# Patient Record
Sex: FEMALE | Race: WHITE | Employment: OTHER | URBAN - METROPOLITAN AREA
[De-identification: names, ages, dates, MRNs, and addresses within clinical notes are randomized per-mention and may not be internally consistent; named-entity substitution may affect disease eponyms.]

---

## 2017-08-29 ENCOUNTER — HOSPITAL ENCOUNTER (OUTPATIENT)
Dept: PHYSICAL THERAPY | Age: 77
Discharge: HOME | End: 2017-08-29
Payer: MEDICARE

## 2017-08-29 ENCOUNTER — HOSPITAL ENCOUNTER (OUTPATIENT)
Dept: SURGERY | Age: 77
Discharge: HOME | End: 2017-08-29
Payer: MEDICARE

## 2017-08-29 VITALS
WEIGHT: 174 LBS | SYSTOLIC BLOOD PRESSURE: 147 MMHG | HEART RATE: 76 BPM | HEIGHT: 61 IN | DIASTOLIC BLOOD PRESSURE: 85 MMHG | BODY MASS INDEX: 32.85 KG/M2 | RESPIRATION RATE: 16 BRPM | TEMPERATURE: 96.3 F | OXYGEN SATURATION: 96 %

## 2017-08-29 LAB
ANION GAP SERPL CALC-SCNC: 11 MMOL/L (ref 7–16)
APPEARANCE UR: CLEAR
APTT PPP: 26.4 SEC (ref 23.5–31.7)
ATRIAL RATE: 72 BPM
BACTERIA SPEC CULT: ABNORMAL
BACTERIA URNS QL MICRO: ABNORMAL /HPF
BASOPHILS # BLD: 0 K/UL (ref 0–0.2)
BASOPHILS NFR BLD: 1 % (ref 0–2)
BILIRUB UR QL: NEGATIVE
BUN SERPL-MCNC: 20 MG/DL (ref 8–23)
CALCIUM SERPL-MCNC: 9.7 MG/DL (ref 8.3–10.4)
CALCULATED P AXIS, ECG09: 32 DEGREES
CALCULATED R AXIS, ECG10: -55 DEGREES
CALCULATED T AXIS, ECG11: 106 DEGREES
CASTS URNS QL MICRO: ABNORMAL /LPF
CHLORIDE SERPL-SCNC: 101 MMOL/L (ref 98–107)
CO2 SERPL-SCNC: 28 MMOL/L (ref 21–32)
COLOR UR: YELLOW
CREAT SERPL-MCNC: 0.89 MG/DL (ref 0.6–1)
DIAGNOSIS, 93000: NORMAL
DIFFERENTIAL METHOD BLD: ABNORMAL
EOSINOPHIL # BLD: 0.3 K/UL (ref 0–0.8)
EOSINOPHIL NFR BLD: 4 % (ref 0.5–7.8)
EPI CELLS #/AREA URNS HPF: ABNORMAL /HPF
ERYTHROCYTE [DISTWIDTH] IN BLOOD BY AUTOMATED COUNT: 13.4 % (ref 11.9–14.6)
GLUCOSE SERPL-MCNC: 129 MG/DL (ref 65–100)
GLUCOSE UR STRIP.AUTO-MCNC: NEGATIVE MG/DL
HCT VFR BLD AUTO: 43.5 % (ref 35.8–46.3)
HGB BLD-MCNC: 14.5 G/DL (ref 11.7–15.4)
HGB UR QL STRIP: NEGATIVE
IMM GRANULOCYTES # BLD: 0 K/UL (ref 0–0.5)
IMM GRANULOCYTES NFR BLD: 0.3 % (ref 0–5)
INR PPP: 1 (ref 0.9–1.2)
KETONES UR QL STRIP.AUTO: NEGATIVE MG/DL
LEUKOCYTE ESTERASE UR QL STRIP.AUTO: ABNORMAL
LYMPHOCYTES # BLD: 2.7 K/UL (ref 0.5–4.6)
LYMPHOCYTES NFR BLD: 37 % (ref 13–44)
MCH RBC QN AUTO: 30.5 PG (ref 26.1–32.9)
MCHC RBC AUTO-ENTMCNC: 33.3 G/DL (ref 31.4–35)
MCV RBC AUTO: 91.6 FL (ref 79.6–97.8)
MONOCYTES # BLD: 0.4 K/UL (ref 0.1–1.3)
MONOCYTES NFR BLD: 5 % (ref 4–12)
NEUTS SEG # BLD: 3.8 K/UL (ref 1.7–8.2)
NEUTS SEG NFR BLD: 53 % (ref 43–78)
NITRITE UR QL STRIP.AUTO: NEGATIVE
P-R INTERVAL, ECG05: 144 MS
PH UR STRIP: 6.5 [PH] (ref 5–9)
PLATELET # BLD AUTO: 303 K/UL (ref 150–450)
PMV BLD AUTO: 10.3 FL (ref 10.8–14.1)
POTASSIUM SERPL-SCNC: 3.6 MMOL/L (ref 3.5–5.1)
PROT UR STRIP-MCNC: NEGATIVE MG/DL
PROTHROMBIN TIME: 10.6 SEC (ref 9.6–12)
Q-T INTERVAL, ECG07: 416 MS
QRS DURATION, ECG06: 160 MS
QTC CALCULATION (BEZET), ECG08: 455 MS
RBC # BLD AUTO: 4.75 M/UL (ref 4.05–5.25)
RBC #/AREA URNS HPF: ABNORMAL /HPF
SERVICE CMNT-IMP: ABNORMAL
SODIUM SERPL-SCNC: 140 MMOL/L (ref 136–145)
SP GR UR REFRACTOMETRY: 1.01 (ref 1–1.02)
UROBILINOGEN UR QL STRIP.AUTO: 0.2 EU/DL (ref 0.2–1)
VENTRICULAR RATE, ECG03: 72 BPM
WBC # BLD AUTO: 7.2 K/UL (ref 4.3–11.1)
WBC URNS QL MICRO: ABNORMAL /HPF

## 2017-08-29 PROCEDURE — G8979 MOBILITY GOAL STATUS: HCPCS

## 2017-08-29 PROCEDURE — 87641 MR-STAPH DNA AMP PROBE: CPT | Performed by: PHYSICIAN ASSISTANT

## 2017-08-29 PROCEDURE — G8980 MOBILITY D/C STATUS: HCPCS

## 2017-08-29 PROCEDURE — 36415 COLL VENOUS BLD VENIPUNCTURE: CPT | Performed by: PHYSICIAN ASSISTANT

## 2017-08-29 PROCEDURE — 85730 THROMBOPLASTIN TIME PARTIAL: CPT | Performed by: PHYSICIAN ASSISTANT

## 2017-08-29 PROCEDURE — 80048 BASIC METABOLIC PNL TOTAL CA: CPT | Performed by: PHYSICIAN ASSISTANT

## 2017-08-29 PROCEDURE — G8978 MOBILITY CURRENT STATUS: HCPCS

## 2017-08-29 PROCEDURE — 77030027138 HC INCENT SPIROMETER -A

## 2017-08-29 PROCEDURE — 85610 PROTHROMBIN TIME: CPT | Performed by: PHYSICIAN ASSISTANT

## 2017-08-29 PROCEDURE — 93005 ELECTROCARDIOGRAM TRACING: CPT | Performed by: ANESTHESIOLOGY

## 2017-08-29 PROCEDURE — 81003 URINALYSIS AUTO W/O SCOPE: CPT | Performed by: PHYSICIAN ASSISTANT

## 2017-08-29 PROCEDURE — 97161 PT EVAL LOW COMPLEX 20 MIN: CPT

## 2017-08-29 PROCEDURE — 85025 COMPLETE CBC W/AUTO DIFF WBC: CPT | Performed by: PHYSICIAN ASSISTANT

## 2017-08-29 RX ORDER — MULTIVIT WITH MINERALS/HERBS
1 TABLET ORAL 2 TIMES WEEKLY
COMMUNITY

## 2017-08-29 NOTE — PERIOP NOTES
Patient verified name, , and surgery as listed in [facility]. Type 3 surgery, PAT joint assessment complete. Labs per surgeon: cbc, bmp, UA, PT, PTT, MRSA nasal swab; results within anesthesia guidelines. MRSA nasal swab remains pending. Labs per anesthesia protocol: no additional labs  EKG:done today- within anesthesia guidelines. Pt. has a pacemaker. All records including last office visit and last interrogation requested from   cardiology via fax request ([phone]). No need to contact [facility] for pacemaker because it is a below the waist surgery. Hibiclens and instructions to return bottle on DOS given per hospital policy. Nasal Swab collected per MD order and instructions for Mupirocin nasal ointment if required. Patient provided with handouts including Guide to Surgery, Pain Management, Hand Hygiene, Blood Transfusion Education, and [facility] Brochure. Patient answered medical/surgical history questions at their best of ability. All prior to admission medications documented in [facility]. Original medication prescription bottle not visualized during patient appointment. Patient instructed to hold all vitamins 7 days prior to surgery and NSAIDS 5 days prior to surgery. Medications to be held: mobic and vitamins. Patient instructed to continue previous medications as prescribed prior to surgery and to take the following medications the day of surgery according to anesthesia guidelines with a small sip of water: aspirin and coreg. Patient teach back successful and patient demonstrates knowledge of instruction.

## 2017-08-29 NOTE — PROGRESS NOTES
Meghan Ohm  : [birth year](10 y.o.) [facility] [place] [address].  [phone]       Physical Therapy Prehab Plan of Treatment and Evaluation Summary:2017    ICD-10: Treatment Diagnosis:   · Pain in Right Knee (M25.561)  · Stiffness of Right Knee, Not elsewhere classified (M25.661)  Precautions/Allergies:   Ace inhibitors and Demerol [meperidine]  MEDICAL/REFERRING DIAGNOSIS:  Unilateral primary osteoarthritis, right knee [M17.11]  REFERRING PHYSICIAN: Burgess Malloy,*  DATE OF SURGERY:   Assessment:   Comments:  She had a L TKA in . She plans on going home at [place] with her spouses assist     PROBLEM LIST (Impacting functional limitations):  Ms. Autumn Orellana presents with the following right lower extremity(s) problems:  1. Strength  2. Range of Motion  3. Home Exercise Program  4. Pain   INTERVENTIONS PLANNED:  1. Home Exercise Program  2. Educational Discussion     TREATMENT PLAN: Effective Dates: 2017 TO 2017. Frequency/Duration: Patient to continue to perform home exercise program at least twice per day up until her surgery. GOALS: (Goals have been discussed and agreed upon with patient.)  Discharge Goals: Time Frame: 1 Day  1. Patient will demonstrate independence with a home exercise program designed to increase strength, range of motion and pain control to minimize functional deficits and optimize patient for total joint replacement. Rehabilitation Potential For Stated Goals: Good  Regarding Steve Reina's therapy, I certify that the treatment plan above will be carried out by a therapist or under their direction.   Thank you for this referral,  Tomas Ferreira, PT               HISTORY:   Present Symptoms:  Pain Intensity 1: 5 (at its worst)  Pain Location 1: Knee  Pain Orientation 1: Anterior, Medial, Right   History of Present Injury/Illness (Reason for Referral):  Medical/Referring Diagnosis: Unilateral primary osteoarthritis, right knee [M17.11]   Past Medical History/Comorbidities:   Ms. Bhavna Dacosta  has a past medical history of Acquired keratoderma; Aortic valve sclerosis; Conduction disorder, unspecified; Contusion of chest wall; Herpes zoster without mention of complication; History of third degree heart block; Hypercholesterolemia; Hypertension; Impaired fasting glucose (2014); Loss of weight; Mixed hyperlipidemia (2014); Osteoarthritis; Osteoporosis, post-menopausal; Other malaise and fatigue; Other primary cardiomyopathies (2014); PAF (paroxysmal atrial fibrillation) ([address] (2013); Paroxysmal a-fib ([address] Pes anserinus tendinitis or bursitis; PUD (peptic ulcer disease); Sick sinus syndrome ([address] Sick sinus syndrome ([address] (2013); Third degree heart block ([address] (2013); Unspecified essential hypertension (2014); and Unspecified vitamin D deficiency. She also has no past medical history of Aneurysm ([address] Coagulation defects; COPD; Difficult intubation; Heart failure ([address] Malignant hyperthermia due to anesthesia; Morbid obesity ([address] Nausea & vomiting; Other ill-defined conditions; Pseudocholinesterase deficiency; Psychiatric disorder; Unspecified adverse effect of anesthesia; or Unspecified sleep apnea. Ms. Bhavna Dacosta  has a past surgical history that includes pacemaker placement (); tonsillectomy (); appendectomy ();  section; dilation and curettage (multiple ); total abdominal hysterectomy (); breast lumpectomy (); open cholecystectomy (); hammer toe repair (); cholecystectomy; pacemaker; and orthopaedic (Left).   Social History/Living Environment:   Home Environment: Private residence  # Steps to Enter: 5  Hand Rails : Right  One/Two Story Residence: Two story, live on 1st floor  # of Interior Steps: 10  Interior Rails: Left  Living Alone: No  Support Systems: Spouse/Significant Other/Partner  Patient Expects to be Discharged to[employer] residence  Current DME Used/Available at Home: Juanita Brennan, rolling, Shower chair, Commode, bedside  Tub or Shower Type: Shower  Work/Activity:  retired  Dominant Side:  RIGHT  Current Medications:  See [address] note   Number of Personal Factors/Comorbidities that affect the Plan of Care: 1-2: MODERATE COMPLEXITY   EXAMINATION:   ADLs (Current Functional Status):   Ambulation:  [x] Independent  [] Walk Indoors Only  [x] Walk Outdoors  [] Use Assistive Device  [] Use Wheelchair Only Dressing:  [x] [address] from Someone for:  [] Sock/Shoes  [] Pants  [] Everything   Bathing/Showering:   [x] Independent  [] Requires Assistance from Someone  [] [address] Only Household Activities:  [x] Routine house and yard work  [] Light Housework Only  [] None   Observation/Orthostatic Postural Assessment:    Genu varus right  ROM/Flexibility:   AROM: Within functional limits (L LE)                       RLE AROM  R Knee Flexion: 105  R Knee Extension: 15   Strength:   Strength: Generally decreased, functional (L LE 4/5)              RLE Strength  R Hip Flexion: 4  R Knee Extension: 3+  R Ankle Dorsiflexion: 4   Functional Mobility:    Sensation: Intact (LL E)    Stand to Sit: Independent  Sit to Stand: Independent  Stand Pivot Transfers: Independent  Distance (ft): 1000 Feet (ft)  Ambulation - Level of Assistance: Independent  Speed/Helen: Pace decreased (<100 feet/min)  Gait Abnormalities: Antalgic          Balance:    Sitting: Intact  Standing: Intact   Body Structures Involved:  1. Bones  2. Joints  3. Muscles Body Functions Affected:  1. Neuromusculoskeletal  2. Movement Related Activities and Participation Affected:  1. Mobility   Number of elements that affect the Plan of Care: 4+: HIGH COMPLEXITY   CLINICAL PRESENTATION:   Presentation: Stable and uncomplicated: LOW COMPLEXITY   CLINICAL DECISION MAKING:   Outcome Measure:    Tool Used: Lower Extremity Functional Scale (LEFS)  Score: Initial: 41/80 Most Recent: X/80 (Date: -- )   Interpretation of Score: 20 questions each scored on a 5 point scale with 0 representing \"extreme difficulty or unable to perform\" and 4 representing \"no difficulty\". The lower the score, the greater the functional disability. 80/80 represents no disability. Minimal detectable change is 9 points. Score 80 79-65 64-49 48-33 32-17 16-1 0   Modifier CH CI CJ CK CL CM CN     ? Mobility - Walking and Moving Around:     - CURRENT STATUS: CK - 40%-59% impaired, limited or restricted    - GOAL STATUS: CK - 40%-59% impaired, limited or restricted    - D/C STATUS:  CK - 40%-59% impaired, limited or restricted    Medical Necessity:   · Ms. Reina is expected to optimize her lower extremity strength and ROM in preparation for joint replacement surgery. Reason for Services/Other Comments:  · Achieve baseline assesment of musculoskeletal system, functional mobility and home environment. , educate in PT HEP in preparation for surgery, educate in hospital plan of care. Use of outcome tool(s) and clinical judgement create a POC that gives a: Clear prediction of patient's progress: LOW COMPLEXITY   TREATMENT:   Treatment/Session Assessment:  Patient was instructed in PT- HEP to increase strength and ROM in LEs. Answered all questions. · Post session pain:  1  · Compliance with Program/Exercises: compliant all of the time.   Total Treatment Duration:PT Patient Time In/Time Out  Time In: 1030  Time Out: [address] [place], [place]

## 2017-08-29 NOTE — ADVANCED PRACTICE NURSE
Total Joint Surgery Preoperative Chart Review      Patient ID:  Kiah Gentile  [id number]  89 y.o.  1940  Surgeon: Dr. Richard Caballero  Date of Surgery: 2017  Procedure: Total Right Knee Arthroplasty  Primary Care Physician: Ramiro Pagan MD None  Specialty Physician(s):      Subjective:   Kiah Gentile is a 68 y.o. WHITE [place]  female who presents for preoperative evaluation for Total Right Knee arthroplasty. This is a preoperative chart review note based on data collected by the nurse at the surgical Pre-Assessment visit. Past Medical History:   Diagnosis Date    Acquired keratoderma     Pt. denies     Aortic valve sclerosis     NO stenosis as of 8/3/2012- pt. denies     Conduction disorder, unspecified     Contusion of chest wall     Herpes zoster without mention of complication     History of third degree heart block     pacemaker    Hypercholesterolemia     controlled with meds     Hypertension     Controlled by meds     Impaired fasting glucose 2014    Loss of weight     Mixed hyperlipidemia 2014    Murmur, cardiac     As a child- pt. states resolved now.      Osteoarthritis     Osteoporosis, post-menopausal     Other malaise and fatigue     Other primary cardiomyopathies 2014    PAF (paroxysmal atrial fibrillation) (HCC) 2013    Paroxysmal a-fib (HCC)     Pes anserinus tendinitis or bursitis     pt denies     PUD (peptic ulcer disease)     was + for h pylori, tx'd     Sick sinus syndrome ([address]     Sick sinus syndrome ([address] 2013    Third degree heart block ([address] 2013    Unspecified essential hypertension 2014    Unspecified vitamin D deficiency       Past Surgical History:   Procedure Laterality Date    HX APPENDECTOMY      HX BREAST LUMPECTOMY  1990    left, benign    HX  SECTION      x 3    HX CHOLECYSTECTOMY      HX DILATION AND CURETTAGE  multiple 1960s    HX HAMMER TOE REPAIR      HX OPEN CHOLECYSTECTOMY 1992    HX ORTHOPAEDIC Left     left knee replacement    HX PACEMAKER      HX PACEMAKER PLACEMENT  2006, 2013    dual wire, upper and lower chamber pacemaker dependent    HX TONSILLECTOMY  1958    HX TOTAL ABDOMINAL HYSTERECTOMY  1969     Family History   Problem Relation Age of Onset    Heart Disease Mother     Diabetes Mother     Hypertension Mother     Heart Disease Father     Heart Attack Father     Hypertension Father     Malignant Hyperthermia Neg Hx     Pseudocholinesterase Deficiency Neg Hx     Delayed Awakening Neg Hx     Post-op Nausea/Vomiting Neg Hx     Emergence Delirium Neg Hx     Post-op Cognitive Dysfunction Neg Hx     Other Neg Hx       Social History   Substance Use Topics    Smoking status: Never Smoker    Smokeless tobacco: Never Used    Alcohol use No       Prior to Admission medications    Medication Sig Start Date End Date Taking? Authorizing Provider   b complex vitamins (B-100 COMPLEX) tablet Take 1 Tab by mouth two (2) times a week. Yes Historical Provider   meloxicam (MOBIC) 15 mg tablet TAKE 1 TABLET EVERY DAY  Patient taking differently: TAKE 1 TABLET EVERY DAY; Pt. takes 1/2 tablet daily and a one tablet with flare ups. 1/22/16  Yes Ramiro Pagan MD   carvedilol (COREG) 12.5 mg tablet TAKE 1 TABLET TWICE DAILY 1/22/16  Yes Ramiro Pagan MD   losartan-hydrochlorothiazide [facility]) 50-12.5 mg per tablet Take 1 Tab by mouth daily. 11/17/15  Yes Ramiro Pagan MD   lovastatin (MEVACOR) 40 mg tablet Take 1 Tab by mouth nightly. 10/12/15  Yes Ramiro Pagan MD   omega-3 fatty acids-vitamin e (FISH OIL) 1,000 mg cap Take 2 Caps by mouth daily. Yes Historical Provider   calcium 500 mg tab Take 1 Tab by mouth two (2) times a day. Yes Historical Provider   L-Methylfolate-B30-Oyskbcswlk (CEREFOLIN NAC) tablet Take 1 Tab by mouth daily. Yes Historical Provider   aspirin delayed-release 81 mg tablet Take 81 mg by mouth nightly.    Yes Historical Provider   cyanocobalamin (VITAMIN B-12) 500 mcg tablet Take 500 mcg by mouth daily. Indications: \"increase energy\"   Yes Historical Provider   Cholecalciferol, Vitamin D3, (VITAMIN D3) 1,000 unit cap Take 2 Tabs by mouth daily. Takes 2 per day in the Fall/winter only   Indications: PREVENTION OF VITAMIN D DEFICIENCY   Yes Historical Provider     Allergies   Allergen Reactions    Ace Inhibitors Cough    Demerol [Meperidine] Nausea and Vomiting          Objective:     Physical Exam:   Patient Vitals for the past 24 hrs:   Temp Pulse Resp BP SpO2   08/29/17 1204 96.3 °F (35.7 °C) 76 16 147/85 96 %       ECG:    EKG Results     Procedure 720 Value Units Date/Time    EKG, 12 LEAD, INITIAL [[id number]] Collected:  08/29/17 1247    Order Status:  Completed Updated:  08/29/17 1259     Ventricular Rate 72 BPM      Atrial Rate 72 BPM      P-R Interval 144 ms      QRS Duration 160 ms      Q-T Interval 416 ms      QTC Calculation (Bezet) 455 ms      Calculated P Axis 32 degrees      Calculated R Axis -55 degrees      Calculated T Axis 106 degrees      Diagnosis --     AV sequential or dual chamber electronic pacemaker  When compared with ECG of 27-AUG-2013 12:42,  Vent. rate has decreased BY  12 BPM            Data Review:   Labs:   Recent Results (from the past 24 hour(s))   CBC WITH AUTOMATED DIFF    Collection Time: 08/29/17 10:43 AM   Result Value Ref Range    WBC 7.2 4.3 - 11.1 K/uL    RBC 4.75 4.05 - 5.25 M/uL    HGB 14.5 11.7 - 15.4 g/dL    HCT 43.5 35.8 - 46.3 %    MCV 91.6 79.6 - 97.8 FL    MCH 30.5 26.1 - 32.9 PG    MCHC 33.3 31.4 - 35.0 g/dL    RDW 13.4 11.9 - 14.6 %    PLATELET 460 749 - 907 K/uL    MPV 10.3 (L) 10.8 - 14.1 FL    DF AUTOMATED      NEUTROPHILS 53 43 - 78 %    LYMPHOCYTES 37 13 - 44 %    MONOCYTES 5 4.0 - 12.0 %    EOSINOPHILS 4 0.5 - 7.8 %    BASOPHILS 1 0.0 - 2.0 %    IMMATURE GRANULOCYTES 0.3 0.0 - 5.0 %    ABS. NEUTROPHILS 3.8 1.7 - 8.2 K/UL    ABS. LYMPHOCYTES 2.7 0.5 - 4.6 K/UL    ABS.  MONOCYTES 0.4 0.1 - 1.3 K/UL ABS. EOSINOPHILS 0.3 0.0 - 0.8 K/UL    ABS. BASOPHILS 0.0 0.0 - 0.2 K/UL    ABS. IMM. GRANS. 0.0 0.0 - 0.5 K/UL   PROTHROMBIN TIME + INR    Collection Time: 08/29/17 10:43 AM   Result Value Ref Range    Prothrombin time 10.6 9.6 - 12.0 sec    INR 1.0 0.9 - 1.2     PTT    Collection Time: 08/29/17 10:43 AM   Result Value Ref Range    aPTT 26.4 23.5 - 20.2 SEC   METABOLIC PANEL, BASIC    Collection Time: 08/29/17 10:43 AM   Result Value Ref Range    Sodium 140 136 - 145 mmol/L    Potassium 3.6 3.5 - 5.1 mmol/L    Chloride 101 98 - 107 mmol/L    CO2 28 21 - 32 mmol/L    Anion gap 11 7 - 16 mmol/L    Glucose 129 (H) 65 - 100 mg/dL    BUN 20 8 - 23 MG/DL    Creatinine 0.89 0.6 - 1.0 MG/DL    GFR est AA >60 >60 ml/min/1.73m2    GFR est non-AA >60 >60 ml/min/1.73m2    Calcium 9.7 8.3 - 10.4 MG/DL   URINALYSIS W/ RFLX MICROSCOPIC    Collection Time: 08/29/17 10:43 AM   Result Value Ref Range    Color YELLOW      Appearance CLEAR      Specific gravity 1.012 1.001 - 1.023      pH (UA) 6.5 5.0 - 9.0      Protein NEGATIVE  NEG mg/dL    Glucose NEGATIVE  NEG mg/dL    Ketone NEGATIVE  NEG mg/dL    Bilirubin NEGATIVE  NEG      Blood NEGATIVE  NEG      Urobilinogen 0.2 0.2 - 1.0 EU/dL    Nitrites NEGATIVE  NEG      Leukocyte Esterase TRACE (A) NEG      WBC 5-10 0 /hpf    RBC 0-3 0 /hpf    Epithelial cells 0-3 0 /hpf    Bacteria 1+ (H) 0 /hpf    Casts 0-3 0 /lpf   EKG, 12 LEAD, INITIAL    Collection Time: 08/29/17 12:47 PM   Result Value Ref Range    Ventricular Rate 72 BPM    Atrial Rate 72 BPM    P-R Interval 144 ms    QRS Duration 160 ms    Q-T Interval 416 ms    QTC Calculation (Bezet) 455 ms    Calculated P Axis 32 degrees    Calculated R Axis -55 degrees    Calculated T Axis 106 degrees    Diagnosis       AV sequential or dual chamber electronic pacemaker  When compared with ECG of 27-AUG-2013 12:42,  Vent.  rate has decreased BY  12 BPM           Problem List:  )  Patient Active Problem List   Diagnosis Code    Osteoarthritis of left knee M17.12    Total knee replacement status Z96.659    PAF (paroxysmal atrial fibrillation) (HCC) I48.0    Sick sinus syndrome (HCC) I49.5    Third degree heart block (HCC) I44.2    Essential hypertension I10    Mixed hyperlipidemia E78.2    Disorder of bone and cartilage, unspecified M89.9, M94.9    Impaired fasting glucose R73.01    Other primary cardiomyopathies I42.8    Osteoarthritis M19.90       Total Joint Surgery Pre-Assessment Recommendations:         None        Signed By: Koki Lloyd NP-C    August 29, 2017

## 2017-08-29 NOTE — PROGRESS NOTES
08/29/17 1030   Oxygen Therapy   O2 Sat (%) 97 %   Pulse via Oximetry 77 beats per minute   O2 Device Room air   Pre-Treatment   Breath Sounds Bilateral Clear   Pre FEV1 (liters) 1.9 liters   % Predicted 106   Incentive Spirometry Treatment   Actual Volume (ml) 1500 ml     Initial respiratory Assessment completed with pt. Pt was interviewed and evaluated in [facility] prior to surgery. Patient ID:  Nj Bingham  [id number]  72 y.o.  1940  Surgeon: Dr. Marylene Campion  Date of Surgery: 9/21/2017  Procedure: Total Right Knee Arthroplasty  Primary Care Physician: Izabella Wright MD None  Specialists:                                  Pt instructed in the use of Incentive Spirometry. Pt instructed to bring Incentive Spirometer back on date of surgery & to start using Is upon return to pt room. Pt taught proper cough technique      History of smoking:   NONE      Quit date:    Secondhand smoke:FATHER      Past procedures with Oxygen desaturation:NONE    Past Medical History:   Diagnosis Date    Acquired keratoderma     Pt. denies     Aortic valve sclerosis     NO stenosis as of 8/3/2012- pt. denies     Conduction disorder, unspecified     Contusion of chest wall     Herpes zoster without mention of complication     History of third degree heart block     pacemaker    Hypercholesterolemia     controlled with meds     Hypertension     Controlled by meds     Impaired fasting glucose 9/4/2014    Loss of weight     Mixed hyperlipidemia 9/4/2014    Murmur, cardiac     As a child- pt. states resolved now.      Osteoarthritis     Osteoporosis, post-menopausal     Other malaise and fatigue     Other primary cardiomyopathies 9/4/2014    PAF (paroxysmal atrial fibrillation) (HCC) 9/20/2013    Paroxysmal a-fib (HCC)     Pes anserinus tendinitis or bursitis     pt denies     PUD (peptic ulcer disease)     was + for h pylori, tx'd     Sick sinus syndrome ([address]     Sick sinus syndrome ([address] 9/20/2013    Third degree heart block [facility] 2013    Unspecified essential hypertension 2014    Unspecified vitamin D deficiency                                                                                                                                                        Respiratory history:                                 SOB  ON EXERTION                                    Respiratory meds:                                                         FAMILY PRESENT:             YES                                               PAST SLEEP STUDY:               NO  HX OF CHIKI:                             NO                                     CHIKI assessment:                                               SLEEPS ON SIDE      PHYSICAL EXAM   Body mass index is 32.88 kg/(m^2).    Visit Vitals    /85 (BP 1 Location: Left arm, BP Patient Position: At rest;Sitting)    Pulse 76    Temp 96.3 °F (35.7 °C)    Resp 16    Ht 5' 1\" (1.549 m)    Wt 78.9 kg (174 lb)    SpO2 96%    BMI 32.88 kg/m2     Neck circumference:35      cm    Loud snoring:   DENIES    Witnessed apnea or wakening gasping or choking:,DENIES,      Awakens with headaches:DENIES    Morning or daytime tiredness/ sleepiness: DENIES     Dry mouth or sore throat in morning:  DENIES    Freidman stage:4    SACS score:4    STOP/BAN                              CPAP:NA               NONE  Referrals:    Pt. Phone Number:

## 2017-08-29 NOTE — PERIOP NOTES
Labs sent to PCP per surgeon's request.    Recent Results (from the past 12 hour(s))   CBC WITH AUTOMATED DIFF    Collection Time: 08/29/17 10:43 AM   Result Value Ref Range    WBC 7.2 4.3 - 11.1 K/uL    RBC 4.75 4.05 - 5.25 M/uL    HGB 14.5 11.7 - 15.4 g/dL    HCT 43.5 35.8 - 46.3 %    MCV 91.6 79.6 - 97.8 FL    MCH 30.5 26.1 - 32.9 PG    MCHC 33.3 31.4 - 35.0 g/dL    RDW 13.4 11.9 - 14.6 %    PLATELET 437 878 - 539 K/uL    MPV 10.3 (L) 10.8 - 14.1 FL    DF AUTOMATED      NEUTROPHILS 53 43 - 78 %    LYMPHOCYTES 37 13 - 44 %    MONOCYTES 5 4.0 - 12.0 %    EOSINOPHILS 4 0.5 - 7.8 %    BASOPHILS 1 0.0 - 2.0 %    IMMATURE GRANULOCYTES 0.3 0.0 - 5.0 %    ABS. NEUTROPHILS 3.8 1.7 - 8.2 K/UL    ABS. LYMPHOCYTES 2.7 0.5 - 4.6 K/UL    ABS. MONOCYTES 0.4 0.1 - 1.3 K/UL    ABS. EOSINOPHILS 0.3 0.0 - 0.8 K/UL    ABS. BASOPHILS 0.0 0.0 - 0.2 K/UL    ABS. IMM.  GRANS. 0.0 0.0 - 0.5 K/UL   PROTHROMBIN TIME + INR    Collection Time: 08/29/17 10:43 AM   Result Value Ref Range    Prothrombin time 10.6 9.6 - 12.0 sec    INR 1.0 0.9 - 1.2     PTT    Collection Time: 08/29/17 10:43 AM   Result Value Ref Range    aPTT 26.4 23.5 - 85.0 SEC   METABOLIC PANEL, BASIC    Collection Time: 08/29/17 10:43 AM   Result Value Ref Range    Sodium 140 136 - 145 mmol/L    Potassium 3.6 3.5 - 5.1 mmol/L    Chloride 101 98 - 107 mmol/L    CO2 28 21 - 32 mmol/L    Anion gap 11 7 - 16 mmol/L    Glucose 129 (H) 65 - 100 mg/dL    BUN 20 8 - 23 MG/DL    Creatinine 0.89 0.6 - 1.0 MG/DL    GFR est AA >60 >60 ml/min/1.73m2    GFR est non-AA >60 >60 ml/min/1.73m2    Calcium 9.7 8.3 - 10.4 MG/DL   URINALYSIS W/ RFLX MICROSCOPIC    Collection Time: 08/29/17 10:43 AM   Result Value Ref Range    Color YELLOW      Appearance CLEAR      Specific gravity 1.012 1.001 - 1.023      pH (UA) 6.5 5.0 - 9.0      Protein NEGATIVE  NEG mg/dL    Glucose NEGATIVE  NEG mg/dL    Ketone NEGATIVE  NEG mg/dL    Bilirubin NEGATIVE  NEG      Blood NEGATIVE  NEG      Urobilinogen 0.2 0.2 - 1.0 EU/dL    Nitrites NEGATIVE  NEG      Leukocyte Esterase TRACE (A) NEG      WBC 5-10 0 /hpf    RBC 0-3 0 /hpf    Epithelial cells 0-3 0 /hpf    Bacteria 1+ (H) 0 /hpf    Casts 0-3 0 /lpf   EKG, 12 LEAD, INITIAL    Collection Time: 08/29/17 12:47 PM   Result Value Ref Range    Ventricular Rate 72 BPM    Atrial Rate 72 BPM    P-R Interval 144 ms    QRS Duration 160 ms    Q-T Interval 416 ms    QTC Calculation (Bezet) 455 ms    Calculated P Axis 32 degrees    Calculated R Axis -55 degrees    Calculated T Axis 106 degrees    Diagnosis       AV sequential or dual chamber electronic pacemaker  When compared with ECG of 27-AUG-2013 12:42,  Vent.  rate has decreased BY  12 BPM  Confirmed by [facility] [place] [facility]  MD (), Jack Chan ([id number]) on 8/29/2017 1:41:07 PM

## 2017-08-30 RX ORDER — MUPIROCIN 20 MG/G
OINTMENT TOPICAL 2 TIMES DAILY
COMMUNITY
Start: 2017-09-16 | End: 2017-09-20

## 2017-08-30 NOTE — PERIOP NOTES
Nasal swab results reviewed:   Culture result:  (A)    Final   MRSA target DNA not detected, SA target DNA detected.   A MRSA negative, SA positive test result does not preclude MRSA nasal colonization. Called pt, left message to return call to PAT re: lab results    Called Mupirocin Rx to [address]  Pt to apply to ea nostril intranasally twice a day for 5 days beginning :9/16/17

## 2017-09-15 NOTE — H&P
H&P    Patient ID:  Carito Freeman  [id number]  3 y.o.  1940  Surgeon:  Zeke Pallas, MD  Date of Surgery: * No surgery date entered *  Procedure: Right Knee Total Arthroplasty  Primary Care Physician: Christiano Alexander MD        Subjective:  Carito Freeman is a 68 y.o. WHITE [place]  female who presents with Right Knee pain. She has history of Right Knee pain for several years ago. Symptoms worse with walking, squatting, climbing stairs, weight bearing, initiation of activity and bathing and relieved with rest, NSAIDs: How long months, activity modification and steroid injections. Conservative treatment consisting of  activity modification, NSAIDs, analgesics and therapeutic injections into the knee has not helped. The patient  lives with their family. The patients goal after surgery is improved pain and function. Past Medical History:   Diagnosis Date    Acquired keratoderma     Pt. denies     Aortic valve sclerosis     per echo dated 3/11/16- no aortic stenosis noted      Conduction disorder, unspecified     Contusion of chest wall     Herpes zoster without mention of complication     History of third degree heart block     pacemaker    Hypercholesterolemia     controlled with meds     Hypertension     Controlled by meds     Impaired fasting glucose 9/4/2014    Loss of weight     Mixed hyperlipidemia 9/4/2014    Murmur, cardiac     As a child- pt. states resolved now.      Osteoarthritis     Osteoporosis, post-menopausal     Other malaise and fatigue     Other primary cardiomyopathies 9/4/2014    PAF (paroxysmal atrial fibrillation) (HCC) 9/20/2013    Paroxysmal a-fib (HCC)     Pes anserinus tendinitis or bursitis     pt denies     PUD (peptic ulcer disease)     was + for h pylori, tx'd     Sick sinus syndrome ([address]     Sick sinus syndrome ([address] 9/20/2013    Third degree heart block ([address] 9/20/2013    Unspecified essential hypertension 9/4/2014    Unspecified vitamin D deficiency       Past Surgical History:   Procedure Laterality Date    HX APPENDECTOMY      HX BREAST LUMPECTOMY  1990    left, benign    HX  SECTION      x 3    HX CHOLECYSTECTOMY  1992    HX DILATION AND CURETTAGE  multiple 1960s    HX HAMMER TOE REPAIR  1998    HX OPEN CHOLECYSTECTOMY  1992    HX ORTHOPAEDIC Left     left knee replacement    HX PACEMAKER      HX PACEMAKER PLACEMENT  ,     dual wire, upper and lower chamber pacemaker dependent    HX TONSILLECTOMY      HX TOTAL ABDOMINAL HYSTERECTOMY  1969     Family History   Problem Relation Age of Onset    Heart Disease Mother     Diabetes Mother     Hypertension Mother     Heart Disease Father     Heart Attack Father     Hypertension Father     Malignant Hyperthermia Neg Hx     Pseudocholinesterase Deficiency Neg Hx     Delayed Awakening Neg Hx     Post-op Nausea/Vomiting Neg Hx     Emergence Delirium Neg Hx     Post-op Cognitive Dysfunction Neg Hx     Other Neg Hx       Social History   Substance Use Topics    Smoking status: Never Smoker    Smokeless tobacco: Never Used    Alcohol use No       Prior to Admission medications    Medication Sig Start Date End Date Taking? Authorizing Provider   mupirocin (BACTROBAN) 2 % ointment by Both Nostrils route two (2) times a day. 17  Historical Provider   b complex vitamins (B-100 COMPLEX) tablet Take 1 Tab by mouth two (2) times a week. Historical Provider   meloxicam (MOBIC) 15 mg tablet TAKE 1 TABLET EVERY DAY  Patient taking differently: TAKE 1 TABLET EVERY DAY; Pt. takes 1/2 tablet daily and a one tablet with flare ups. 16   Mary Palomo MD   carvedilol (COREG) 12.5 mg tablet TAKE 1 TABLET TWICE DAILY 16   Mary Palomo MD   losartan-hydrochlorothiazide [facility]) 50-12.5 mg per tablet Take 1 Tab by mouth daily. 11/17/15   Mary Palomo MD   lovastatin (MEVACOR) 40 mg tablet Take 1 Tab by mouth nightly.  10/12/15   Kurt Red Alejandro Downey MD   omega-3 fatty acids-vitamin e (FISH OIL) 1,000 mg cap Take 2 Caps by mouth daily. Historical Provider   calcium 500 mg tab Take 1 Tab by mouth two (2) times a day. Historical Provider   L-Methylfolate-C11-Rwesmemidg (CEREFOLIN NAC) tablet Take 1 Tab by mouth daily. Historical Provider   aspirin delayed-release 81 mg tablet Take 81 mg by mouth nightly. Historical Provider   cyanocobalamin (VITAMIN B-12) 500 mcg tablet Take 500 mcg by mouth daily. Indications: \"increase energy\"    Historical Provider   Cholecalciferol, Vitamin D3, (VITAMIN D3) 1,000 unit cap Take 2 Tabs by mouth daily. Takes 2 per day in the Fall/winter only   Indications: PREVENTION OF VITAMIN D DEFICIENCY    Historical Provider     Allergies   Allergen Reactions    Ace Inhibitors Cough    Demerol [Meperidine] Nausea and Vomiting        REVIEW OF SYSTEMS:  CONSTITUTIONAL: Denies fever, decreased appetite, weight loss/gain, night sweats or fatigue. HEENT: Denies vision or hearing changes. denies glasses. denies hearing aids. CARDIAC: Denies CP, palpitations, rheumatic fever, murmur, peripheral edema, carotid artery disease or syncopal episodes. RESPIRATORY: Denies dyspnea on exertion, asthma, COPD or orthopnea. GI: Denies GERD, history of GI bleed or melena, PUD, hepatitis or cirrhosis. : Denies dysuria, hematuria. denies BPH symptoms. HEMATOLOGIC: Denies anemia or blood disorders. ENDOCRINE: Denies thyroid disease. MUSCULOSKELETAL: See HPI. NEUROLOGIC: Denies seizure, peripheral neuropathy or memory loss. PSYCH: Denies depression, anxiety or insomnia. SKIN: Denies rash or open sores. Objective:    PHYSICAL EXAM  GENERAL: No data found. EYES: PERRL. EOM intact. MOUTH:Teeth and Gums normal. NECK: Full ROM. Trachea midline. No thyromegaly or JVD. CARDIOVASCULAR: Regular rate and rhythm. No murmur or gallops. No carotid bruits. Peripheral pulses: radial  +, PT +, DP + bilaterally. LUNGS: CTA bilaterally.  No wheezes, rhonchi or rales. GI: positive BS. Abdomen nontender. NEUROLOGIC: Alert and oriented x 3. Bilateral equal strong had grasp and bilateral equal strong plantar flexion and dorsiflexion. GAIT: normal  MUSCULOSKELETAL: ROM: diminished range with pain. Tenderness: Medial joint line and Patella. Crepitus: present. SKIN: No rash, bruising, swelling, redness or warmth. Labs:  No results found for this or any previous visit (from the past 24 hour(s)). Xray Right Knee: Subchondral sclerosis  Joint space narrowing  Bone on bone articulation    Assessment:  Advanced Right Knee Osteoarthritis. Total Right Knee Arthroplasty Indicated. Patient Active Problem List   Diagnosis Code    Osteoarthritis of left knee M17.12    Total knee replacement status Z96.659    PAF (paroxysmal atrial fibrillation) (HCC) I48.0    Sick sinus syndrome (HCC) I49.5    Third degree heart block (HCC) I44.2    Essential hypertension I10    Mixed hyperlipidemia E78.2    Disorder of bone and cartilage, unspecified M89.9, M94.9    Impaired fasting glucose R73.01    Other primary cardiomyopathies I42.8    Osteoarthritis M19.90       Plan:  I have advised the patient of the risks and consequences, including possible complications of performing total joint replacement, as well as not doing this operation. The patient had the opportunity to ask questions and have them answered to their satisfaction.      Signed:  EVITA Corral 9/15/2017

## 2017-09-20 ENCOUNTER — ANESTHESIA EVENT (OUTPATIENT)
Dept: SURGERY | Age: 77
DRG: 470 | End: 2017-09-20
Payer: MEDICARE

## 2017-09-21 ENCOUNTER — ANESTHESIA (OUTPATIENT)
Dept: SURGERY | Age: 77
DRG: 470 | End: 2017-09-21
Payer: MEDICARE

## 2017-09-21 ENCOUNTER — HOSPITAL ENCOUNTER (INPATIENT)
Age: 77
LOS: 1 days | Discharge: HOME HEALTH | DRG: 470 | End: 2017-09-22
Attending: ORTHOPAEDIC SURGERY | Admitting: ORTHOPAEDIC SURGERY
Payer: MEDICARE

## 2017-09-21 DIAGNOSIS — Z96.651 STATUS POST TOTAL RIGHT KNEE REPLACEMENT: Primary | ICD-10-CM

## 2017-09-21 PROBLEM — M17.11 ARTHRITIS OF KNEE, RIGHT: Status: ACTIVE | Noted: 2017-09-21

## 2017-09-21 LAB
ABO + RH BLD: NORMAL
APPEARANCE UR: ABNORMAL
BACTERIA URNS QL MICRO: ABNORMAL /HPF
BILIRUB UR QL: NEGATIVE
BLOOD GROUP ANTIBODIES SERPL: NORMAL
COLOR UR: YELLOW
EPI CELLS #/AREA URNS HPF: ABNORMAL /HPF
GLUCOSE BLD STRIP.AUTO-MCNC: 124 MG/DL (ref 65–100)
GLUCOSE UR STRIP.AUTO-MCNC: NEGATIVE MG/DL
HGB BLD-MCNC: 12 G/DL (ref 11.7–15.4)
HGB UR QL STRIP: NEGATIVE
KETONES UR QL STRIP.AUTO: NEGATIVE MG/DL
LEUKOCYTE ESTERASE UR QL STRIP.AUTO: ABNORMAL
NITRITE UR QL STRIP.AUTO: POSITIVE
PH UR STRIP: 6 [PH] (ref 5–9)
PROT UR STRIP-MCNC: NEGATIVE MG/DL
RBC #/AREA URNS HPF: ABNORMAL /HPF
SP GR UR REFRACTOMETRY: 1.02 (ref 1–1.02)
SPECIMEN EXP DATE BLD: NORMAL
UROBILINOGEN UR QL STRIP.AUTO: 0.2 EU/DL (ref 0.2–1)
WBC URNS QL MICRO: ABNORMAL /HPF

## 2017-09-21 PROCEDURE — 77030032490 HC SLV COMPR SCD KNE COVD -B

## 2017-09-21 PROCEDURE — C1713 ANCHOR/SCREW BN/BN,TIS/BN: HCPCS | Performed by: ORTHOPAEDIC SURGERY

## 2017-09-21 PROCEDURE — 87086 URINE CULTURE/COLONY COUNT: CPT | Performed by: ORTHOPAEDIC SURGERY

## 2017-09-21 PROCEDURE — 76210000016 HC OR PH I REC 1 TO 1.5 HR: Performed by: ORTHOPAEDIC SURGERY

## 2017-09-21 PROCEDURE — 74011000258 HC RX REV CODE- 258: Performed by: PHYSICIAN ASSISTANT

## 2017-09-21 PROCEDURE — 82962 GLUCOSE BLOOD TEST: CPT

## 2017-09-21 PROCEDURE — 77030002966 HC SUT PDS J&J -A: Performed by: ORTHOPAEDIC SURGERY

## 2017-09-21 PROCEDURE — 77030020782 HC GWN BAIR PAWS FLX 3M -B: Performed by: ANESTHESIOLOGY

## 2017-09-21 PROCEDURE — 77030018836 HC SOL IRR NACL ICUM -A: Performed by: ORTHOPAEDIC SURGERY

## 2017-09-21 PROCEDURE — 77030011208: Performed by: ORTHOPAEDIC SURGERY

## 2017-09-21 PROCEDURE — 74011000258 HC RX REV CODE- 258: Performed by: ORTHOPAEDIC SURGERY

## 2017-09-21 PROCEDURE — 87186 SC STD MICRODIL/AGAR DIL: CPT | Performed by: ORTHOPAEDIC SURGERY

## 2017-09-21 PROCEDURE — 76010010054 HC POST OP PAIN BLOCK: Performed by: ORTHOPAEDIC SURGERY

## 2017-09-21 PROCEDURE — 76060000034 HC ANESTHESIA 1.5 TO 2 HR: Performed by: ORTHOPAEDIC SURGERY

## 2017-09-21 PROCEDURE — 77010033678 HC OXYGEN DAILY

## 2017-09-21 PROCEDURE — 76010000162 HC OR TIME 1.5 TO 2 HR INTENSV-TIER 1: Performed by: ORTHOPAEDIC SURGERY

## 2017-09-21 PROCEDURE — 77030013727 HC IRR FAN PULSVC ZIMM -B: Performed by: ORTHOPAEDIC SURGERY

## 2017-09-21 PROCEDURE — 77030011640 HC PAD GRND REM COVD -A: Performed by: ORTHOPAEDIC SURGERY

## 2017-09-21 PROCEDURE — 65270000029 HC RM PRIVATE

## 2017-09-21 PROCEDURE — 77030031139 HC SUT VCRL2 J&J -A: Performed by: ORTHOPAEDIC SURGERY

## 2017-09-21 PROCEDURE — 0SRC0J9 REPLACEMENT OF RIGHT KNEE JOINT WITH SYNTHETIC SUBSTITUTE, CEMENTED, OPEN APPROACH: ICD-10-PCS | Performed by: ORTHOPAEDIC SURGERY

## 2017-09-21 PROCEDURE — 74011000250 HC RX REV CODE- 250

## 2017-09-21 PROCEDURE — 97161 PT EVAL LOW COMPLEX 20 MIN: CPT

## 2017-09-21 PROCEDURE — 74011000250 HC RX REV CODE- 250: Performed by: ANESTHESIOLOGY

## 2017-09-21 PROCEDURE — 77030012935 HC DRSG AQUACEL BMS -B: Performed by: ORTHOPAEDIC SURGERY

## 2017-09-21 PROCEDURE — 77030003602 HC NDL NRV BLK BBMI -B: Performed by: ANESTHESIOLOGY

## 2017-09-21 PROCEDURE — 86900 BLOOD TYPING SEROLOGIC ABO: CPT | Performed by: PHYSICIAN ASSISTANT

## 2017-09-21 PROCEDURE — 77030008467 HC STPLR SKN COVD -B: Performed by: ORTHOPAEDIC SURGERY

## 2017-09-21 PROCEDURE — 74011250636 HC RX REV CODE- 250/636: Performed by: ANESTHESIOLOGY

## 2017-09-21 PROCEDURE — 87088 URINE BACTERIA CULTURE: CPT | Performed by: ORTHOPAEDIC SURGERY

## 2017-09-21 PROCEDURE — 74011250636 HC RX REV CODE- 250/636: Performed by: ORTHOPAEDIC SURGERY

## 2017-09-21 PROCEDURE — 77030003665 HC NDL SPN BBMI -A: Performed by: ANESTHESIOLOGY

## 2017-09-21 PROCEDURE — 74011250637 HC RX REV CODE- 250/637: Performed by: ANESTHESIOLOGY

## 2017-09-21 PROCEDURE — 77030034849: Performed by: ORTHOPAEDIC SURGERY

## 2017-09-21 PROCEDURE — 77030035643 HC BLD SAW OSC PRECIS STRY -C: Performed by: ORTHOPAEDIC SURGERY

## 2017-09-21 PROCEDURE — 85018 HEMOGLOBIN: CPT | Performed by: PHYSICIAN ASSISTANT

## 2017-09-21 PROCEDURE — 74011250636 HC RX REV CODE- 250/636

## 2017-09-21 PROCEDURE — 77030036688 HC BLNKT CLD THER S2SG -B

## 2017-09-21 PROCEDURE — 77030002933 HC SUT MCRYL J&J -A: Performed by: ORTHOPAEDIC SURGERY

## 2017-09-21 PROCEDURE — 74011250637 HC RX REV CODE- 250/637: Performed by: PHYSICIAN ASSISTANT

## 2017-09-21 PROCEDURE — 76942 ECHO GUIDE FOR BIOPSY: CPT | Performed by: ORTHOPAEDIC SURGERY

## 2017-09-21 PROCEDURE — 74011250636 HC RX REV CODE- 250/636: Performed by: PHYSICIAN ASSISTANT

## 2017-09-21 PROCEDURE — 77030006720 HC BLD PAT RMR ZIMM -B: Performed by: ORTHOPAEDIC SURGERY

## 2017-09-21 PROCEDURE — 77030007880 HC KT SPN EPDRL BBMI -B: Performed by: ANESTHESIOLOGY

## 2017-09-21 PROCEDURE — C1776 JOINT DEVICE (IMPLANTABLE): HCPCS | Performed by: ORTHOPAEDIC SURGERY

## 2017-09-21 PROCEDURE — 77030019557 HC ELECTRD VES SEAL MEDT -F: Performed by: ORTHOPAEDIC SURGERY

## 2017-09-21 PROCEDURE — 74011000250 HC RX REV CODE- 250: Performed by: ORTHOPAEDIC SURGERY

## 2017-09-21 PROCEDURE — 94760 N-INVAS EAR/PLS OXIMETRY 1: CPT

## 2017-09-21 PROCEDURE — 97165 OT EVAL LOW COMPLEX 30 MIN: CPT

## 2017-09-21 PROCEDURE — 36415 COLL VENOUS BLD VENIPUNCTURE: CPT | Performed by: PHYSICIAN ASSISTANT

## 2017-09-21 PROCEDURE — 81003 URINALYSIS AUTO W/O SCOPE: CPT | Performed by: ORTHOPAEDIC SURGERY

## 2017-09-21 DEVICE — COMPNT FEM PS CEM TRIATHLN 3 R --: Type: IMPLANTABLE DEVICE | Site: KNEE | Status: FUNCTIONAL

## 2017-09-21 DEVICE — BASEPLT TIB UNIV TRIATHLN 3 --: Type: IMPLANTABLE DEVICE | Site: KNEE | Status: FUNCTIONAL

## 2017-09-21 DEVICE — (D)CEMENT BNE HV R 40GM -- DUPE USE ITEM 353850: Type: IMPLANTABLE DEVICE | Site: KNEE | Status: FUNCTIONAL

## 2017-09-21 DEVICE — COMPONENT PAT DIA31MM THK9MM KNEE SYMMETRIC NP PRI CEM W/O: Type: IMPLANTABLE DEVICE | Site: KNEE | Status: FUNCTIONAL

## 2017-09-21 DEVICE — INSERT TIB SZ 3 11MM KNEE POLY POST STBL CONVENTIONAL: Type: IMPLANTABLE DEVICE | Site: KNEE | Status: FUNCTIONAL

## 2017-09-21 RX ORDER — SODIUM CHLORIDE 0.9 % (FLUSH) 0.9 %
5-10 SYRINGE (ML) INJECTION AS NEEDED
Status: DISCONTINUED | OUTPATIENT
Start: 2017-09-21 | End: 2017-09-22 | Stop reason: HOSPADM

## 2017-09-21 RX ORDER — NALOXONE HYDROCHLORIDE 0.4 MG/ML
0.2 INJECTION, SOLUTION INTRAMUSCULAR; INTRAVENOUS; SUBCUTANEOUS AS NEEDED
Status: DISCONTINUED | OUTPATIENT
Start: 2017-09-21 | End: 2017-09-21 | Stop reason: HOSPADM

## 2017-09-21 RX ORDER — ACETAMINOPHEN 500 MG
1000 TABLET ORAL EVERY 6 HOURS
Status: DISCONTINUED | OUTPATIENT
Start: 2017-09-22 | End: 2017-09-22 | Stop reason: HOSPADM

## 2017-09-21 RX ORDER — DEXAMETHASONE SODIUM PHOSPHATE 100 MG/10ML
10 INJECTION INTRAMUSCULAR; INTRAVENOUS ONCE
Status: DISCONTINUED | OUTPATIENT
Start: 2017-09-22 | End: 2017-09-22 | Stop reason: HOSPADM

## 2017-09-21 RX ORDER — NALOXONE HYDROCHLORIDE 0.4 MG/ML
.2-.4 INJECTION, SOLUTION INTRAMUSCULAR; INTRAVENOUS; SUBCUTANEOUS
Status: DISCONTINUED | OUTPATIENT
Start: 2017-09-21 | End: 2017-09-22 | Stop reason: HOSPADM

## 2017-09-21 RX ORDER — CEFAZOLIN SODIUM IN 0.9 % NACL 2 G/50 ML
2 INTRAVENOUS SOLUTION, PIGGYBACK (ML) INTRAVENOUS ONCE
Status: COMPLETED | OUTPATIENT
Start: 2017-09-21 | End: 2017-09-21

## 2017-09-21 RX ORDER — SODIUM CHLORIDE 0.9 % (FLUSH) 0.9 %
5-10 SYRINGE (ML) INJECTION EVERY 8 HOURS
Status: DISCONTINUED | OUTPATIENT
Start: 2017-09-21 | End: 2017-09-22 | Stop reason: HOSPADM

## 2017-09-21 RX ORDER — SODIUM CHLORIDE, SODIUM LACTATE, POTASSIUM CHLORIDE, CALCIUM CHLORIDE 600; 310; 30; 20 MG/100ML; MG/100ML; MG/100ML; MG/100ML
75 INJECTION, SOLUTION INTRAVENOUS CONTINUOUS
Status: DISCONTINUED | OUTPATIENT
Start: 2017-09-21 | End: 2017-09-21 | Stop reason: HOSPADM

## 2017-09-21 RX ORDER — SODIUM CHLORIDE 9 MG/ML
100 INJECTION, SOLUTION INTRAVENOUS CONTINUOUS
Status: DISCONTINUED | OUTPATIENT
Start: 2017-09-21 | End: 2017-09-22 | Stop reason: HOSPADM

## 2017-09-21 RX ORDER — SODIUM CHLORIDE, SODIUM LACTATE, POTASSIUM CHLORIDE, CALCIUM CHLORIDE 600; 310; 30; 20 MG/100ML; MG/100ML; MG/100ML; MG/100ML
100 INJECTION, SOLUTION INTRAVENOUS CONTINUOUS
Status: DISCONTINUED | OUTPATIENT
Start: 2017-09-21 | End: 2017-09-21 | Stop reason: HOSPADM

## 2017-09-21 RX ORDER — FENTANYL CITRATE 50 UG/ML
100 INJECTION, SOLUTION INTRAMUSCULAR; INTRAVENOUS ONCE
Status: COMPLETED | OUTPATIENT
Start: 2017-09-21 | End: 2017-09-21

## 2017-09-21 RX ORDER — AMOXICILLIN 250 MG
2 CAPSULE ORAL DAILY
Status: DISCONTINUED | OUTPATIENT
Start: 2017-09-22 | End: 2017-09-22 | Stop reason: HOSPADM

## 2017-09-21 RX ORDER — CELECOXIB 200 MG/1
200 CAPSULE ORAL EVERY 12 HOURS
Status: DISCONTINUED | OUTPATIENT
Start: 2017-09-21 | End: 2017-09-22 | Stop reason: HOSPADM

## 2017-09-21 RX ORDER — KETOROLAC TROMETHAMINE 30 MG/ML
INJECTION, SOLUTION INTRAMUSCULAR; INTRAVENOUS AS NEEDED
Status: DISCONTINUED | OUTPATIENT
Start: 2017-09-21 | End: 2017-09-21 | Stop reason: HOSPADM

## 2017-09-21 RX ORDER — ONDANSETRON 2 MG/ML
INJECTION INTRAMUSCULAR; INTRAVENOUS AS NEEDED
Status: DISCONTINUED | OUTPATIENT
Start: 2017-09-21 | End: 2017-09-21 | Stop reason: HOSPADM

## 2017-09-21 RX ORDER — CARVEDILOL 25 MG/1
12.5 TABLET ORAL 2 TIMES DAILY WITH MEALS
Status: DISCONTINUED | OUTPATIENT
Start: 2017-09-21 | End: 2017-09-22 | Stop reason: HOSPADM

## 2017-09-21 RX ORDER — CELECOXIB 200 MG/1
200 CAPSULE ORAL ONCE
Status: COMPLETED | OUTPATIENT
Start: 2017-09-21 | End: 2017-09-21

## 2017-09-21 RX ORDER — MORPHINE SULFATE 10 MG/ML
INJECTION, SOLUTION INTRAMUSCULAR; INTRAVENOUS AS NEEDED
Status: DISCONTINUED | OUTPATIENT
Start: 2017-09-21 | End: 2017-09-21 | Stop reason: HOSPADM

## 2017-09-21 RX ORDER — PROPOFOL 10 MG/ML
INJECTION, EMULSION INTRAVENOUS
Status: DISCONTINUED | OUTPATIENT
Start: 2017-09-21 | End: 2017-09-21 | Stop reason: HOSPADM

## 2017-09-21 RX ORDER — ASPIRIN 325 MG
325 TABLET, DELAYED RELEASE (ENTERIC COATED) ORAL EVERY 12 HOURS
Qty: 120 TAB | Refills: 0 | Status: SHIPPED
Start: 2017-09-21

## 2017-09-21 RX ORDER — HYDROMORPHONE HYDROCHLORIDE 1 MG/ML
1 INJECTION, SOLUTION INTRAMUSCULAR; INTRAVENOUS; SUBCUTANEOUS
Status: DISCONTINUED | OUTPATIENT
Start: 2017-09-21 | End: 2017-09-22 | Stop reason: HOSPADM

## 2017-09-21 RX ORDER — ROPIVACAINE HYDROCHLORIDE 2 MG/ML
INJECTION, SOLUTION EPIDURAL; INFILTRATION; PERINEURAL AS NEEDED
Status: DISCONTINUED | OUTPATIENT
Start: 2017-09-21 | End: 2017-09-21 | Stop reason: HOSPADM

## 2017-09-21 RX ORDER — OXYCODONE HYDROCHLORIDE 5 MG/1
5 TABLET ORAL
Status: DISCONTINUED | OUTPATIENT
Start: 2017-09-21 | End: 2017-09-21 | Stop reason: HOSPADM

## 2017-09-21 RX ORDER — FENTANYL CITRATE 50 UG/ML
INJECTION, SOLUTION INTRAMUSCULAR; INTRAVENOUS AS NEEDED
Status: DISCONTINUED | OUTPATIENT
Start: 2017-09-21 | End: 2017-09-21 | Stop reason: HOSPADM

## 2017-09-21 RX ORDER — MIDAZOLAM HYDROCHLORIDE 1 MG/ML
2 INJECTION, SOLUTION INTRAMUSCULAR; INTRAVENOUS ONCE
Status: COMPLETED | OUTPATIENT
Start: 2017-09-21 | End: 2017-09-21

## 2017-09-21 RX ORDER — DEXAMETHASONE SODIUM PHOSPHATE 100 MG/10ML
INJECTION INTRAMUSCULAR; INTRAVENOUS AS NEEDED
Status: DISCONTINUED | OUTPATIENT
Start: 2017-09-21 | End: 2017-09-21 | Stop reason: HOSPADM

## 2017-09-21 RX ORDER — ROPIVACAINE HYDROCHLORIDE 5 MG/ML
INJECTION, SOLUTION EPIDURAL; INFILTRATION; PERINEURAL AS NEEDED
Status: DISCONTINUED | OUTPATIENT
Start: 2017-09-21 | End: 2017-09-21 | Stop reason: HOSPADM

## 2017-09-21 RX ORDER — DIPHENHYDRAMINE HCL 25 MG
25 CAPSULE ORAL
Status: DISCONTINUED | OUTPATIENT
Start: 2017-09-21 | End: 2017-09-22 | Stop reason: HOSPADM

## 2017-09-21 RX ORDER — OXYCODONE HYDROCHLORIDE 5 MG/1
10 TABLET ORAL
Status: DISCONTINUED | OUTPATIENT
Start: 2017-09-21 | End: 2017-09-22 | Stop reason: HOSPADM

## 2017-09-21 RX ORDER — SODIUM CHLORIDE 0.9 % (FLUSH) 0.9 %
5-10 SYRINGE (ML) INJECTION EVERY 8 HOURS
Status: DISCONTINUED | OUTPATIENT
Start: 2017-09-21 | End: 2017-09-21 | Stop reason: HOSPADM

## 2017-09-21 RX ORDER — OXYCODONE HYDROCHLORIDE 10 MG/1
10 TABLET ORAL
Qty: 60 TAB | Refills: 0 | Status: SHIPPED | OUTPATIENT
Start: 2017-09-21

## 2017-09-21 RX ORDER — ACETAMINOPHEN 500 MG
1000 TABLET ORAL ONCE
Status: COMPLETED | OUTPATIENT
Start: 2017-09-21 | End: 2017-09-21

## 2017-09-21 RX ORDER — NEOMYCIN AND POLYMYXIN B SULFATES 40; 200000 MG/ML; [USP'U]/ML
SOLUTION IRRIGATION AS NEEDED
Status: DISCONTINUED | OUTPATIENT
Start: 2017-09-21 | End: 2017-09-21 | Stop reason: HOSPADM

## 2017-09-21 RX ORDER — ACETAMINOPHEN 10 MG/ML
1000 INJECTION, SOLUTION INTRAVENOUS ONCE
Status: COMPLETED | OUTPATIENT
Start: 2017-09-21 | End: 2017-09-21

## 2017-09-21 RX ORDER — ZOLPIDEM TARTRATE 5 MG/1
5 TABLET ORAL
Status: DISCONTINUED | OUTPATIENT
Start: 2017-09-21 | End: 2017-09-22 | Stop reason: HOSPADM

## 2017-09-21 RX ORDER — SODIUM CHLORIDE 0.9 % (FLUSH) 0.9 %
5-10 SYRINGE (ML) INJECTION AS NEEDED
Status: DISCONTINUED | OUTPATIENT
Start: 2017-09-21 | End: 2017-09-21 | Stop reason: HOSPADM

## 2017-09-21 RX ORDER — ASPIRIN 325 MG
325 TABLET, DELAYED RELEASE (ENTERIC COATED) ORAL EVERY 12 HOURS
Status: DISCONTINUED | OUTPATIENT
Start: 2017-09-21 | End: 2017-09-22 | Stop reason: HOSPADM

## 2017-09-21 RX ORDER — BUPIVACAINE HYDROCHLORIDE 7.5 MG/ML
INJECTION, SOLUTION INTRASPINAL AS NEEDED
Status: DISCONTINUED | OUTPATIENT
Start: 2017-09-21 | End: 2017-09-21 | Stop reason: HOSPADM

## 2017-09-21 RX ORDER — TRANEXAMIC ACID 100 MG/ML
INJECTION, SOLUTION INTRAVENOUS AS NEEDED
Status: DISCONTINUED | OUTPATIENT
Start: 2017-09-21 | End: 2017-09-21 | Stop reason: HOSPADM

## 2017-09-21 RX ORDER — HYDROMORPHONE HYDROCHLORIDE 2 MG/ML
0.5 INJECTION, SOLUTION INTRAMUSCULAR; INTRAVENOUS; SUBCUTANEOUS
Status: DISCONTINUED | OUTPATIENT
Start: 2017-09-21 | End: 2017-09-21 | Stop reason: HOSPADM

## 2017-09-21 RX ORDER — LIDOCAINE HYDROCHLORIDE 10 MG/ML
0.1 INJECTION INFILTRATION; PERINEURAL AS NEEDED
Status: DISCONTINUED | OUTPATIENT
Start: 2017-09-21 | End: 2017-09-21 | Stop reason: HOSPADM

## 2017-09-21 RX ORDER — ONDANSETRON 2 MG/ML
4 INJECTION INTRAMUSCULAR; INTRAVENOUS
Status: DISCONTINUED | OUTPATIENT
Start: 2017-09-21 | End: 2017-09-22 | Stop reason: HOSPADM

## 2017-09-21 RX ADMIN — REGULAR STRENGTH 325 MG: 325 TABLET ORAL at 21:18

## 2017-09-21 RX ADMIN — ACETAMINOPHEN 1000 MG: 500 TABLET, FILM COATED ORAL at 06:28

## 2017-09-21 RX ADMIN — CEFAZOLIN SODIUM 1 G: 1 INJECTION, POWDER, FOR SOLUTION INTRAMUSCULAR; INTRAVENOUS at 23:15

## 2017-09-21 RX ADMIN — FENTANYL CITRATE 50 MCG: 50 INJECTION, SOLUTION INTRAMUSCULAR; INTRAVENOUS at 06:56

## 2017-09-21 RX ADMIN — ROPIVACAINE HYDROCHLORIDE 20 ML: 5 INJECTION, SOLUTION EPIDURAL; INFILTRATION; PERINEURAL at 06:59

## 2017-09-21 RX ADMIN — ONDANSETRON 4 MG: 2 INJECTION INTRAMUSCULAR; INTRAVENOUS at 08:19

## 2017-09-21 RX ADMIN — PROPOFOL 50 MCG/KG/MIN: 10 INJECTION, EMULSION INTRAVENOUS at 07:29

## 2017-09-21 RX ADMIN — FENTANYL CITRATE 25 MCG: 50 INJECTION, SOLUTION INTRAMUSCULAR; INTRAVENOUS at 07:59

## 2017-09-21 RX ADMIN — SODIUM CHLORIDE 100 ML/HR: 900 INJECTION, SOLUTION INTRAVENOUS at 10:00

## 2017-09-21 RX ADMIN — ACETAMINOPHEN 1000 MG: 500 TABLET, FILM COATED ORAL at 23:17

## 2017-09-21 RX ADMIN — CEFAZOLIN SODIUM 1 G: 1 INJECTION, POWDER, FOR SOLUTION INTRAMUSCULAR; INTRAVENOUS at 14:34

## 2017-09-21 RX ADMIN — OXYCODONE HYDROCHLORIDE 10 MG: 5 TABLET ORAL at 23:17

## 2017-09-21 RX ADMIN — DEXAMETHASONE SODIUM PHOSPHATE 10 MG: 100 INJECTION INTRAMUSCULAR; INTRAVENOUS at 08:19

## 2017-09-21 RX ADMIN — CELECOXIB 200 MG: 200 CAPSULE ORAL at 06:28

## 2017-09-21 RX ADMIN — CELECOXIB 200 MG: 200 CAPSULE ORAL at 21:18

## 2017-09-21 RX ADMIN — FENTANYL CITRATE 25 MCG: 50 INJECTION, SOLUTION INTRAMUSCULAR; INTRAVENOUS at 07:26

## 2017-09-21 RX ADMIN — MIDAZOLAM HYDROCHLORIDE 2 MG: 1 INJECTION, SOLUTION INTRAMUSCULAR; INTRAVENOUS at 06:56

## 2017-09-21 RX ADMIN — SODIUM CHLORIDE, SODIUM LACTATE, POTASSIUM CHLORIDE, AND CALCIUM CHLORIDE: 600; 310; 30; 20 INJECTION, SOLUTION INTRAVENOUS at 07:09

## 2017-09-21 RX ADMIN — CEFAZOLIN 2 G: 1 INJECTION, POWDER, FOR SOLUTION INTRAMUSCULAR; INTRAVENOUS; PARENTERAL at 07:11

## 2017-09-21 RX ADMIN — FENTANYL CITRATE 50 MCG: 50 INJECTION, SOLUTION INTRAMUSCULAR; INTRAVENOUS at 07:14

## 2017-09-21 RX ADMIN — OXYCODONE HYDROCHLORIDE 10 MG: 5 TABLET ORAL at 14:36

## 2017-09-21 RX ADMIN — CARVEDILOL 12.5 MG: 25 TABLET, FILM COATED ORAL at 17:36

## 2017-09-21 RX ADMIN — ACETAMINOPHEN 1000 MG: 10 INJECTION, SOLUTION INTRAVENOUS at 17:36

## 2017-09-21 RX ADMIN — LIDOCAINE HYDROCHLORIDE 0.1 ML: 10 INJECTION, SOLUTION INFILTRATION; PERINEURAL at 06:28

## 2017-09-21 RX ADMIN — SODIUM CHLORIDE, SODIUM LACTATE, POTASSIUM CHLORIDE, AND CALCIUM CHLORIDE 100 ML/HR: 600; 310; 30; 20 INJECTION, SOLUTION INTRAVENOUS at 06:27

## 2017-09-21 RX ADMIN — OXYCODONE HYDROCHLORIDE 10 MG: 5 TABLET ORAL at 19:28

## 2017-09-21 RX ADMIN — SODIUM CHLORIDE, SODIUM LACTATE, POTASSIUM CHLORIDE, AND CALCIUM CHLORIDE: 600; 310; 30; 20 INJECTION, SOLUTION INTRAVENOUS at 07:53

## 2017-09-21 RX ADMIN — TRANEXAMIC ACID 1000 MG: 100 INJECTION, SOLUTION INTRAVENOUS at 07:13

## 2017-09-21 RX ADMIN — BUPIVACAINE HYDROCHLORIDE 1.8 MG: 7.5 INJECTION, SOLUTION INTRASPINAL at 07:17

## 2017-09-21 NOTE — PROGRESS NOTES
Problem: Mobility Impaired (Adult and Pediatric)  Goal: *Acute Goals and Plan of Care (Insert Text)  GOALS (1-4 days):  (1.)Ms. Reina will move from supine to sit and sit to supine in bed with STAND BY ASSIST.  (2.)Ms. Reina will transfer from bed to chair and chair to bed with STAND BY ASSIST using the least restrictive device. (3.)Ms. Reina will ambulate with STAND BY ASSIST for 200 feet with the least restrictive device. (4.)Ms. Reina will ambulate up/down 3 steps with bilateral railing with CONTACT GUARD ASSIST with no device. (5.)Ms. Tommy Cheng will increase right knee ROM to 5°-80°.  ________________________________________________________________________________________________      PHYSICAL THERAPY JOINT CAMP TKA: INITIAL ASSESSMENT 9/21/2017  INPATIENT: Hospital Day: 1  Payor: Romel Failing / Plan: [address] [place] [address] HMO / Product Type: [employer] /      NAME/AGE/GENDER: Yaritza Lopez is a 68 y.o. female              PRIMARY DIAGNOSIS:  Unilateral primary osteoarthritis, right knee [M17.11]              Procedure(s) and Anesthesia Type:     * RIGHT KNEE ARTHROPLASTY TOTAL/AMBER ANCEF 2gm / Dorothea Princeton - Spinal (Right)  ICD-10: Treatment Diagnosis:        · Pain in Right Knee (M25.561)  · Stiffness of Right Knee, Not elsewhere classified (M25.661)  · Difficulty in walking, Not elsewhere classified (R26.2)  · Other abnormalities of gait and mobility (R26.89)       ASSESSMENT:      Ms. Tommy Cheng presents with decreased strength and ROM R LE and limited functional mobility S/P R TKA. Patient had L TKA in 2014. She plans to go home at discharge with support of spouse. Patient will benefit from skilled therapy services to address the below problem list.       This section established at most recent assessment   PROBLEM LIST (Impairments causing functional limitations):  1. Decreased Strength  2. Decreased ADL/Functional Activities  3. Decreased Transfer Abilities  4.  Decreased Ambulation Ability/Technique  5. Decreased Flexibility/Joint Mobility  6. Decreased [place] with Home Exercise Program    INTERVENTIONS PLANNED: (Benefits and precautions of physical therapy have been discussed with the patient.)  1. Bed Mobility  2. Gait Training  3. Home Exercise Program (HEP)  4. Therapeutic Exercise/Strengthening  5. Transfer Training  6. Range of Motion: active/assisted/passive  7. Therapeutic Activities  8. Group Therapy      TREATMENT PLAN: Frequency/Duration: Follow patient BID   to address above goals. Rehabilitation Potential For Stated Goals: GOOD      RECOMMENDED REHABILITATION/EQUIPMENT: (at time of discharge pending progress): Continue Skilled Therapy and Home Health: Physical Therapy. HISTORY:   History of Present Injury/Illness (Reason for Referral):  S/P R TKA  Past Medical History/Comorbidities:   Ms. Yanira Curry  has a past medical history of Acquired keratoderma; Aortic valve sclerosis; Conduction disorder, unspecified; Contusion of chest wall; Herpes zoster without mention of complication; History of third degree heart block; Hypercholesterolemia; Hypertension; Impaired fasting glucose (9/4/2014); Loss of weight; Mixed hyperlipidemia (9/4/2014); Murmur, cardiac; Osteoarthritis; Osteoporosis, post-menopausal; Other malaise and fatigue; Other primary cardiomyopathies (9/4/2014); PAF (paroxysmal atrial fibrillation) ([address] (9/20/2013); Paroxysmal a-fib ([address] Pes anserinus tendinitis or bursitis; PUD (peptic ulcer disease); Sick sinus syndrome ([address] Sick sinus syndrome ([address] (9/20/2013); Third degree heart block ([address] (9/20/2013); Unspecified essential hypertension (9/4/2014); and Unspecified vitamin D deficiency. She also has no past medical history of Aneurysm ([address] Coagulation defects; COPD; Difficult intubation; Heart failure ([address] Malignant hyperthermia due to anesthesia; Morbid obesity ([address] Nausea & vomiting;  Other ill-defined conditions; Pseudocholinesterase deficiency; Psychiatric disorder; Unspecified adverse effect of anesthesia; or Unspecified sleep apnea. Ms. Tom Khalil  has a past surgical history that includes pacemaker placement (, ); tonsillectomy (); appendectomy ();  section; dilation and curettage (multiple ); total abdominal hysterectomy (); breast lumpectomy (); open cholecystectomy (); hammer toe repair (); cholecystectomy (); pacemaker; and orthopaedic (Left). Social History/Living Environment:   Home Environment: Private residence  # Steps to Enter: 5  Hand Rails : Right  One/Two Story Residence: Two story, live on 1st floor  # of Interior Steps: 10  Interior Rails: Left  Living Alone: No  Support Systems: Spouse/Significant Other/Partner  Patient Expects to be Discharged to[de-identified] Private residence  Current DME Used/Available at Home: Walker, rolling, [address] chair, Commode, bedside  Tub or Shower Type: Shower  Prior Level of Function/Work/Activity:  Independent with gait and ADLs. Number of Personal Factors/Comorbidities that affect the Plan of Care: 1-2: MODERATE COMPLEXITY   EXAMINATION:   Most Recent Physical Functioning:   Gross Assessment: Yes  Gross Assessment  AROM: Within functional limits (except R LE; S/P R TKA)  Strength: Generally decreased, functional  Coordination: Within functional limits (BUE)  Tone: Normal  Sensation: Intact         RLE AROM  R Knee Flexion: 60 (approximate)  R Knee Extension: -20 (approximate)              Bed Mobility  Supine to Sit: Contact guard assistance  Sit to Supine: Contact guard assistance     Transfers  Sit to Stand: Minimum assistance;Assist x1  Stand to Sit: Minimum assistance;Assist x1  Bed to Chair: Minimum assistance;Assist x1     Balance  Sitting: Intact  Standing: With support;Pull to stand    Posture  Posture Assessment:  Forward head;Rounded shoulders           Weight Bearing Status  Right Side Weight Bearing: As tolerated  Distance (ft): 5 Feet (ft) (side steps)  Ambulation - Level of Assistance: Minimal assistance;Assist x1;Additional time  Speed/Helen: Pace decreased (<100 feet/min)  Stance: Right decreased  Gait Abnormalities: Antalgic  Interventions: Manual cues; Verbal cues; Safety awareness training    Assistive device: Rolling walker    Braces/Orthotics: none     Right Knee Cold  Type: Cryocuff       Body Structures Involved:  1. Bones  2. Joints  3. Muscles Body Functions Affected:  1. Neuromusculoskeletal  2. Movement Related Activities and Participation Affected:  1. General Tasks and Demands  2. Mobility  3. Self Care   Number of elements that affect the Plan of Care: 3: MODERATE COMPLEXITY   CLINICAL PRESENTATION:   Presentation: Stable and uncomplicated: LOW COMPLEXITY   CLINICAL DECISION MAKIN[address] [place] [id number] AM-PAC 6 Clicks   Basic Mobility Inpatient Short Form  How much difficulty does the patient currently have. .. Unable A Lot A Little None   1. Turning over in bed (including adjusting bedclothes, sheets and blankets)? [ ] 1   [ ] 2   [X] 3   [ ] 4   2. Sitting down on and standing up from a chair with arms ( e.g., wheelchair, bedside commode, etc.)   [ ] 1   [ ] 2   [X] 3   [ ] 4   3. Moving from lying on back to sitting on the side of the bed? [ ] 1   [ ] 2   [X] 3   [ ] 4   How much help from another person does the patient currently need. .. Total A Lot A Little None   4. Moving to and from a bed to a chair (including a wheelchair)? [ ] 1   [ ] 2   [X] 3   [ ] 4   5. Need to walk in hospital room? [ ] 1   [ ] 2   [X] 3   [ ] 4   6. Climbing 3-5 steps with a railing? [ ] 1   [X] 2   [ ] 3   [ ] 4   © , Trustees of [address] [place] [address] [address] [address], under license to [employer]. All rights reserved       Score:  Initial: 17 Most Recent: X (Date: -- )     Interpretation of Tool:  Represents activities that are increasingly more difficult (i.e. Bed mobility, Transfers, Gait).        Score 24 23 22-20 19-15 14-10 9-7 6       Modifier CH CI CJ CK CL CM CN         · Mobility - Walking and Moving Around:               - CURRENT STATUS:    CK - 40%-59% impaired, limited or restricted               - GOAL STATUS:           CJ - 20%-39% impaired, limited or restricted               - D/C STATUS:                       ---------------To be determined---------------  Payor: Allan Pap / Plan: [address] [place] [address] / Product Type: Managed Care Medicare /       Medical Necessity:     · Patient demonstrates good rehab potential due to higher previous functional level. Reason for Services/Other Comments:  · Patient continues to require present interventions due to patient's inability to perform exercises and functional mobility independently. Use of outcome tool(s) and clinical judgement create a POC that gives a: Clear prediction of patient's progress: LOW COMPLEXITY                 TREATMENT:   (In addition to Assessment/Re-Assessment sessions the following treatments were rendered)      Pre-treatment Symptoms/Complaints:  No pain  Pain: Initial:   Pain Intensity 1: 0  Post Session:  0      Assessment/Reassessment only, no treatment provided today        Date:    Date:    Date:      ACTIVITY/EXERCISE AM PM AM PM AM PM   GROUP THERAPY  [ ]  [ ]  [ ]  [ ]  [ ]  [ ]   Ankle Pumps               Quad Sets               Gluteal Sets               Hip ABd/ADduction               Straight Leg Raises               Knee Slides               Short Arc Quads               Long Arc Quads               Chair Slides                               B = bilateral; AA = active assistive; A = active; P = passive       Treatment/Session Assessment:         Response to Treatment:  tolerated well.      Education:  [ ] Home Exercises  [X] Fall Precautions  [ ] Hip Precautions [ ] D/C Instruction Review  [ ] Knee/Hip Prosthesis Review  [X] Walker Management/Safety [ ] Adaptive Equipment as Needed         Interdisciplinary Collaboration:   · Physical Therapist  · Occupational Therapist  · Registered Nurse     After treatment position/precautions:   · Supine in bed  · Bed/Chair-wheels locked  · Bed in low position  · Call light within reach  · Family at bedside  · Side rails x 3     Compliance with Program/Exercises: Will assess as treatment progresses. Recommendations/Intent for next treatment session:  Treatment next visit will focus on increasing Ms. Render's independence with bed mobility, transfers, gait training, strength/ROM exercises, modalities for pain, and patient education.        Total Treatment Duration:  PT Patient Time In/Time Out  Time In: 1105  Time Out: [address], PT

## 2017-09-21 NOTE — IP AVS SNAPSHOT
[address] 
[phone] Patient: Anatoly Abrams MRN: [id number] You are allergic to the following Allergen Reactions Ace Inhibitors Cough Demerol (Meperidine) Nausea and Vomiting Recent Documentation Height Weight BMI OB Status Smoking Status 1.549 m 78.9 kg 32.88 kg/m2 Hysterectomy Never Smoker Unresulted Labs Order Current Status CULTURE, URINE Preliminary result Emergency Contacts Name Discharge Info Relation Home Work Mobile [address] Emanuel Velez CAREGIVER [3] Spouse [3] [phone] Yehuda Quale [22] [phone] Crystal Anderson  Daughter [21] [phone] About your hospitalization You were admitted on:  2017 You last received care in the:  [facility] You were discharged on:  2017 Unit phone number:  [phone] Why you were hospitalized Your primary diagnosis was:  S/P Total Knee Arthroplasty Your diagnoses also included: Arthritis Of Knee, Right Providers Seen During Your Hospitalizations Provider Role Specialty Primary office phone Carol Easton MD Attending Provider Orthopedic Surgery [phone] Your Primary Care Physician (PCP) Primary Care Physician Office Phone Office Fax STEVIE NAQVI ** None ** ** None ** Follow-up Information Follow up With Details Comments Contact Info Ofelia Perdomo MD  As needed Carol Easton MD  As scheduled by office [address] [address] 
[phone] [facility]  Will contact you within 48 hrs [phone] Current Discharge Medication List  
  
START taking these medications Dose & Instructions Dispensing Information Comments Morning Noon Evening Bedtime oxyCODONE IR 10 mg Tab immediate release tablet Commonly known as:  Teena Castro Your next dose is: Today Dose:  10 mg Take 1 Tab by mouth every four (4) hours as needed. Max Daily Amount: 60 mg.  
 Quantity:  60 Tab Refills:  0 CONTINUE these medications which have CHANGED Dose & Instructions Dispensing Information Comments Morning Noon Evening Bedtime  
 aspirin delayed-release 325 mg tablet What changed:   
- medication strength 
- how much to take - when to take this Your next dose is: Today Dose:  325 mg Take 1 Tab by mouth every twelve (12) hours every twelve (12) hours. Quantity:  120 Tab Refills:  0 CONTINUE these medications which have NOT CHANGED Dose & Instructions Dispensing Information Comments Morning Noon Evening Bedtime B-100 COMPLEX tablet Generic drug:  b complex vitamins Your next dose is: Today Dose:  1 Tab Take 1 Tab by mouth two (2) times a week. Refills:  0  
     
   
   
  
   
  
 calcium 500 mg Tab Your next dose is: Today Dose:  1 Tab Take 1 Tab by mouth two (2) times a day. Refills:  0  
     
   
   
  
   
  
 carvedilol 12.5 mg tablet Commonly known as:  Jennie Ready Your next dose is: Today TAKE 1 TABLET TWICE DAILY Quantity:  180 Tab Refills:  3 CEREFOLIN NAC tablet Generic drug:  L-Methylfolate-P58-Dszzloyamd  
Your next dose is:  Tomorrow Dose:  1 Tab Take 1 Tab by mouth daily. Refills:  0  
     
  
   
   
   
  
 losartan-hydroCHLOROthiazide 50-12.5 mg per tablet Commonly known as:  HYZAAR Your next dose is:  Tomorrow Dose:  1 Tab Take 1 Tab by mouth daily. Quantity:  90 Tab Refills:  1  
     
  
   
   
   
  
 lovastatin 40 mg tablet Commonly known as:  MEVACOR Your next dose is: Today Dose:  40 mg Take 1 Tab by mouth nightly. Quantity:  90 Tab Refills:  3 VITAMIN B-12 500 mcg tablet Generic drug:  cyanocobalamin Your next dose is:  Tomorrow Dose:  500 mcg Take 500 mcg by mouth daily. Indications: \"increase energy\" Refills:  0 STOP taking these medications FISH OIL 1,000 mg Cap Generic drug:  omega-3 fatty acids-vitamin e  
   
  
 meloxicam 15 mg tablet Commonly known as:  MOBIC  
   
  
 VITAMIN D3 1,000 unit Cap Generic drug:  cholecalciferol ASK your doctor about these medications Dose & Instructions Dispensing Information Comments Morning Noon Evening Bedtime  
 mupirocin 2 % ointment [facility]ly known [facility]:  [facility] Ask about: Should I take this medication? by Both Nostrils route two (2) times a day. Refills:  0 Where to Get Your Medications Information on where to get these meds will be given to you by the nurse or doctor. ! Ask your nurse or doctor about these medications  
  aspirin delayed-release 325 mg tablet  
 oxyCODONE IR 10 mg Tab immediate release tablet Discharge Instructions [facility] [employer] Patient Discharge Instructions Man Smith / [id number] : 1940 Admitted 2017 Discharged: 2017 IF YOU HAVE ANY PROBLEMS ONCE YOU ARE AT HOME CALL THE FOLLOWING NUMBERS:  
Main office number: [phone] Take Home Medications · It is important that you take the medication exactly as they are prescribed. · Keep your medication in the bottles provided by the pharmacist and keep a list of the medication names, dosages, and times to be taken in your wallet. · Do not take other medications without consulting your doctor. What to do at [facility] Resume your prehospital diet. If you have excessive nausea or vomitting call your doctor's office Home Physical Therapy is arranged. Use rolling walker when walking. Use Miguelito Hose stockings until we see you in the office for your follow up appointment with Dr. Stephen Estrada Patients who have had a joint replacement should not drive until you are seen for your follow up appointment by Dr. Stephen Estrada. When to Call - Call if you have a temperature greater then 101 
- Unable to keep food down - Loose control of your bladder or bowel function - Are unable to bear any weight  
- Need a pain medication refill DISCHARGE SUMMARY from Nurse The following personal items collected during your admission are returned to you:  
Dental Appliance: Dental Appliances: None Vision: Visual Aid: Glasses Hearing Aid:   na 
Jewelry: Jewelry: Ring (wedding band left hand taped) Clothing: Clothing: Other (comment) (in car or with ) Other Valuables: Other Valuables: Cell Phone ( has) Valuables sent to safe:   na 
 
PATIENT INSTRUCTIONS: 
 
After general anesthesia or intravenous sedation, for 24 hours or while taking prescription Narcotics: · Limit your activities · Do not drive and operate hazardous machinery · Do not make important personal or business decisions · Do  not drink alcoholic beverages · If you have not urinated within 8 hours after discharge, please contact your surgeon on call. Report the following to your surgeon: 
· Excessive pain, swelling, redness or odor of or around the surgical area · Temperature over 101 · Nausea and vomiting lasting longer than 4 hours or if unable to take medications · Any signs of decreased circulation or nerve impairment to extremity: change in color, persistent  numbness, tingling, coldness or increase pain · Any questions, call office @ [phone] Keep scheduled follow up appointment. If need to change, call office @ [phone]. *  Please give a list of your current medications to your Primary Care Provider. *  Please update this list whenever your medications are discontinued, doses are 
    changed, or new medications (including over-the-counter products) are added. *  Please carry medication information at all times in case of emergency situations. Total Knee Replacement: What to Expect at Home Your Recovery When you leave the hospital, you should be able to move around with a walker or crutches. But you will need someone to help you at home for the next few weeks or until you have more energy and can move around better. If there is no one to help you at home, you may go to a rehabilitation center. You will go home with a bandage and stitches or staples. Change the bandage as your doctor tells you to. Your doctor will remove your stitches or staples 10 to 21 days after your surgery. You may still have some mild pain, and the area may be swollen for 3 to 6 months after surgery. Your knee will continue to improve for 6 to 12 months. You will probably use a walker for 1 to 3 weeks and then use crutches. When you are ready, you can use a cane. You will probably be able to walk on your own in 4 to 8 weeks. You will need to do months of physical rehabilitation (rehab) after a knee replacement. Rehab will help you strengthen the muscles of the knee and help you regain movement. After you recover, your artificial knee will allow you to do normal daily activities with less pain or no pain at all. You may be able to hike, dance, ride a bike, and play golf. Talk to your doctor about whether you can do more strenuous activities. Always tell your caregivers that you have an artificial knee. How long it will take to walk on your own, return to normal activities, and go back to work depends on your health and how well your rehabilitation (rehab) program goes. The better you do with your rehab exercises, the quicker you will get your strength and movement back. This care sheet gives you a general idea about how long it will take for you to recover. But each person recovers at a different pace. Follow the steps below to get better as quickly as possible. How can you care for yourself at home? Activity · Rest when you feel tired. You may take a nap, but do not stay in bed all day. When you sit, use a chair with arms. You can use the arms to help you stand up. · Work with your physical therapist to find the best way to exercise. You may be able to take frequent, short walks using crutches or a walker. What you can do as your knee heals will depend on whether your new knee is cemented or uncemented. You may not be able to do certain things for a while if your new knee is uncemented. · After your knee has healed enough, you can do more strenuous activities with caution. ¨ You can golf, but use a golf cart, and do not wear shoes with spikes. ¨ You can bike on a flat road or on a stationary bike. Avoid biking up hills. ¨ Your doctor may suggest that you stay away from activities that put stress on your knee. These include tennis or badminton, squash or racquetball, contact sports like football, jumping (such as in basketball), jogging, or running. ¨ Avoid activities where you might fall. These include horseback riding, skiing, and mountain biking. · Do not sit for more than 1 hour at a time. Get up and walk around for a while before you sit again. If you must sit for a long time, prop up your leg with a chair or footstool. This will help you avoid swelling. · Ask your doctor when you can shower. You may need to take sponge baths until your stitches or staples have been removed. · Ask your doctor when you can drive again. It may take up to 8 weeks after knee replacement surgery before it is safe for you to drive. · When you get into a car, sit on the edge of the seat. Then pull in your legs, and turn to face the front. · You should be able to do many everyday activities 3 to 6 weeks after your surgery. You will probably need to take 4 to 16 weeks off from work. When you can go back to work depends on the type of work you do and how you feel. · Ask your doctor when it is okay for you to have sex. · Do not lift anything heavier than 10 pounds and do not lift weights for 12 weeks. Diet · By the time you leave the hospital, you should be eating your normal diet. If your stomach is upset, try bland, low-fat foods like plain rice, broiled chicken, toast, and yogurt. Your doctor may suggest that you take iron and vitamin supplements. · Drink plenty of fluids (unless your doctor tells you not to). · Eat healthy foods, and watch your portion sizes. Try to stay at your ideal weight. Too much weight puts more stress on your new knee. · You may notice that your bowel movements are not regular right after your surgery. This is common. Try to avoid constipation and straining with bowel movements. You may want to take a fiber supplement every day. If you have not had a bowel movement after a couple of days, ask your doctor about taking a mild laxative. Medicines · Your doctor will tell you if and when you can restart your medicines. He or she will also give you instructions about taking any new medicines. · If you take blood thinners, such as warfarin (Coumadin), clopidogrel (Plavix), or aspirin, be sure to talk to your doctor. He or she will tell you if and when to start taking those medicines again. Make sure that you understand exactly what your doctor wants you to do. · Your doctor may give you a blood-thinning medicine to prevent blood clots. If you take a blood thinner, be sure you get instructions about how to take your medicine safely. Blood thinners can cause serious bleeding problems. This medicine could be in pill form or as a shot (injection). If a shot is necessary, your doctor will tell you how to do this. · Be safe with medicines. Take pain medicines exactly as directed. ¨ If the doctor gave you a prescription medicine for pain, take it as prescribed. ¨ If you are not taking a prescription pain medicine, ask your doctor if you can take an over-the-counter medicine. ¨ Plan to take your pain medicine 30 minutes before exercises. It is easier to prevent pain before it starts than to stop it once it has started. · If you think your pain medicine is making you sick to your stomach: 
¨ Take your medicine after meals (unless your doctor has told you not to). ¨ Ask your doctor for a different pain medicine. · If your doctor prescribed antibiotics, take them as directed. Do not stop taking them just because you feel better. You need to take the full course of antibiotics. Incision care · You will have a bandage over the cut (incision) and staples or stitches. Take the bandage off when your doctor says it is okay. · Your doctor will remove the staples or stitches 10 days to 3 weeks after the surgery and replace them with strips of tape. Leave the tape on for a week or until it falls off. Exercise · Your rehab program will give you a number of exercises to do to help you get back your knee's range of motion and strength. Always do them as your therapist tells you. Ice and elevation · For pain and swelling, put ice or a cold pack on the area for 10 to 20 minutes at a time. Put a thin cloth between the ice and your skin. Other instructions · Continue to wear your support stockings as your doctor says. These help to prevent blood clots. The length of time that you will have to wear them depends on your activity level and the amount of swelling. · Wear medical alert jewelry that says you may need antibiotics before any procedure, including dental work. You can buy this at most [employer]. · You have metal pieces in your knee.  These may set off some airport metal detectors. Carry a medical alert card that says you have an artificial joint, just in case. Follow-up care is a key part of your treatment and safety. Be sure to make and go to all appointments, and call your doctor if you are having problems. It's also a good idea to know your test results and keep a list of the medicines you take. When should you call for help? Call 911 anytime you think you may need emergency care. For example, call if: 
· You passed out (lost consciousness). · You have severe trouble breathing. · You have sudden chest pain and shortness of breath, or you cough up blood. Call your doctor now or seek immediate medical care if: 
· You have signs of infection, such as: 
¨ Increased pain, swelling, warmth, or redness. ¨ Red streaks leading from the incision. ¨ Pus draining from the incision. ¨ A fever. · You have signs of a blood clot, such as: 
¨ Pain in your calf, back of the knee, thigh, or groin. ¨ Redness and swelling in your leg or groin. · Your incision comes open and begins to bleed, or the bleeding increases. · You have pain that does not get better after you take pain medicine. Watch closely for changes in your health, and be sure to contact your doctor if: 
· You do not have a bowel movement after taking a laxative. Where can you learn more? Go to http://erica-maranda.info/. Enter M976 in the search box to learn more about \"Total Knee Replacement: What to Expect at Home. \" Current as of: March 21, 2017 Content Version: 11.3 © 1306-6770 [employer]. Care instructions adapted under license by [employer] (which disclaims liability or warranty for this information). If you have questions about a medical condition or this instruction, always ask your healthcare professional. [address] any warranty or liability for your use of this information. These are general instructions for a healthy lifestyle: No smoking/ No tobacco products/ Avoid exposure to second hand smoke Surgeon General's Warning:  Quitting smoking now greatly reduces serious risk to your health. Obesity, smoking, and sedentary lifestyle greatly increases your risk for illness A healthy diet, regular physical exercise & weight monitoring are important for maintaining a healthy lifestyle You may be retaining fluid if you have a history of heart failure or if you experience any of the following symptoms:  Weight gain of 3 pounds or more overnight or 5 pounds in a week, increased swelling in our hands or feet or shortness of breath while lying flat in bed. Please call your doctor as soon as you notice any of these symptoms; do not wait until your next office visit. Recognize signs and symptoms of STROKE: 
 
F-face looks uneven A-arms unable to move or move even S-speech slurred or non-existent T-time-call 911 as soon as signs and symptoms begin-DO NOT go Back to bed or wait to see if you get better-TIME IS BRAIN. The discharge information has been reviewed with the patient. The patient verbalized understanding. Information obtained by : 
I understand that if any problems occur once I am at home I am to contact my physician. I understand and acknowledge receipt of the instructions indicated above. Physician's or R.N.'s Signature                                                                  Date/Time Patient or Representative Signature                                                          Date/Time Discharge Orders Procedure Order Date Status Priority Quantity Spec Type Associated Dx ACTIVITY AFTER DISCHARGE Patient should: Restrict lifting, Restrict driving 00/19/63 5138 Normal Routine 1  Status post total right knee replacement [[id number]] Questions: Patient should:  Restrict lifting Patient should:  Restrict driving DIET REGULAR No added salt 09/21/17 2059 Normal Routine 1  Status post total right knee replacement [[id number]] Questions: Additional options:  No added salt DRESSING, CHANGE SPECIFY 09/21/17 2059 Normal Routine 1  Status post total right knee replacement [[id number]] Comments:  If staples are present they are to be removed and steri strips placed 10-14 days  after surgery as long as wound is healing. If wound does not appear to be healing the patient is to be seen in the office before removing staples. REFERRAL TO HOME HEALTH 09/21/17 2059 Normal Routine 1  Status post total right knee replacement [[id number]] REFERRAL TO PHYSICAL THERAPY 09/21/17 2059 Normal Routine 1  Status post total right knee replacement [[id number]] Introducing \[facility] [place] [facility]\"" & HEALTH SERVICES! [facility] introduces [employer] patient portal. Now you can access parts of your medical record, email your doctor's office, and request medication refills online. 1. In your internet browser, go to [email or web address] 2. Click on the First Time User? Click Here link in the Sign In box. You will see the New Member Sign Up page. 3. Enter your Enhan[employer] Access Code exactly as it appears below. You will not need to use this code after youve completed the sign-up process. If you do not sign up before the expiration date, you must request a new code. · [employer] Access Code: [email or web address] Expires: 11/14/2017 11:28 AM 
 
4. Enter the last four digits of your Social Security Number (xxxx) and Date of Birth (mm/dd/yyyy) as indicated and click Submit. You will be taken to the next sign-up page. 5. Create a Enhan[employer] ID.  This will be your [employer] login ID and cannot be changed, so think of one that is secure and easy to remember. 6. Create a [employer] password. You can change your password at any time. 7. Enter your Password Reset Question and Answer. This can be used at a later time if you forget your password. 8. Enter your e-mail address. You will receive e-mail notification when new information is available in [address] 9. Click Sign Up. You can now view and download portions of your medical record. 10. Click the Download Summary menu link to download a portable copy of your medical information. If you have questions, please visit the Frequently Asked Questions section of the [employer] website. Remember, [employer] is NOT to be used for urgent needs. For medical emergencies, dial 911. Now available from your iPhone and Android! General Information Please provide this summary of care documentation to your next provider. Patient Signature:  ____________________________________________________________ Date:  ____________________________________________________________  
  
Man Mckenna Provider Signature:  ____________________________________________________________ Date:  ____________________________________________________________

## 2017-09-21 NOTE — ANESTHESIA PROCEDURE NOTES
Spinal Block    Start time: 9/21/2017 7:15 AM  End time: 9/21/2017 7:18 AM  Performed by: Gavino King  Authorized by: Gavino King     Pre-procedure:   Indications: at surgeon's request and primary anesthetic  Preanesthetic Checklist: patient identified, risks and benefits discussed, anesthesia consent, site marked, patient being monitored and timeout performed    Timeout Time: 07:14          Spinal Block:   Patient Position:  Seated  Prep Region:  Lumbar  Prep: chlorhexidine and patient draped      Location:  L3-4  Technique:  Single shot  Local:  Lidocaine 1%  Local Dose (mL):  3    Needle:   Needle Type:  Pencan  Needle Gauge:  25 G  Attempts:  1      Events: CSF confirmed, no blood with aspiration and no paresthesia        Assessment:  Insertion:  Uncomplicated  Patient tolerance:  Patient tolerated the procedure well with no immediate complications

## 2017-09-21 NOTE — PERIOP NOTES
TRANSFER - OUT REPORT:    Verbal report given to Joselin RN(name) on [employer]  being transferred to [facility](unit) for routine post - op       Report consisted of patients Situation, Background, Assessment and   Recommendations(SBAR). Information from the following report(s) SBAR, Kardex, OR Summary, Procedure Summary, Intake/Output and MAR was reviewed with the receiving nurse. Opportunity for questions and clarification was provided.       Patient transported with:   O2 @ 3 liters  Tech

## 2017-09-21 NOTE — PROGRESS NOTES
Problem: Self Care Deficits Care Plan (Adult)  Goal: *Acute Goals and Plan of Care (Insert Text)  GOALS:   DISCHARGE GOALS (in preparation for going home/rehab): 3 days  1. Ms. Tom Khalil will perform one lower body dressing activity with minimal assistance required to demonstrate improved functional mobility and safety. 2. Ms. Tom Khalil will perform one lower body bathing activity with minimal assistance required to demonstrate improved functional mobility and safety. 3. Ms. Tom Khalil will perform toileting/toilet transfer with contact guard assistance to demonstrate improved functional mobility and safety. 4. Ms. Tom Khalil will perform shower transfer with contact guard assistance to demonstrate improved functional mobility and safety. JOINT CAMP OCCUPATIONAL THERAPY TKA: Initial Assessment 9/21/2017  INPATIENT: Hospital Day: 1  Payor: Regan Ryna / Plan: [address] [place] [address] HMO / Product Type: [employer] Care Medicare /      NAME/AGE/GENDER: Zena Augustine is a 68 y.o. female              PRIMARY DIAGNOSIS:  Unilateral primary osteoarthritis, right knee [M17.11]              Procedure(s) and Anesthesia Type:     * RIGHT KNEE ARTHROPLASTY TOTAL/AMBER ANCEF 2gm / Briana Gavel - Spinal (Right)  ICD-10: Treatment Diagnosis:        · Pain in Right Knee (M25.561)  · Stiffness of Right Knee, Not elsewhere classified (M25.661)       ASSESSMENT:      Ms. Tom Khalil is s/p R TKA and presents with decreased weight bearing on R LE and decreased independence with functional mobility and activities of daily living. Patient would benefit from skilled Occupational Therapy to maximize independence and safety with self-care task and functional mobility. Pt would also benefit from education on adaptive equipment and safety precautions in preparation for going home or for recommendations for post-hospital rehab program.  Patient plans for further rehab at home with home health services and good family support.   OT reviewed therapy schedule and plan of care with patient. Patient was able to transfer and preform self care skills as charted below. Patient instructed to call for assistance when needing to get up from the bed and all needs in reach. Patient verbalized understanding of call light. This section established at most recent assessment   PROBLEM LIST (Impairments causing functional limitations):  1. Decreased Strength  2. Decreased ADL/Functional Activities  3. Decreased Transfer Abilities  4. Increased Pain  5. Increased Fatigue  6. Decreased Flexibility/Joint Mobility  7. Decreased Knowledge of Precautions    INTERVENTIONS PLANNED: (Benefits and precautions of occupational therapy have been discussed with the patient.)  1. Activities of daily living training  2. Adaptive equipment training  3. Balance training  4. Clothing management  5. Donning&doffing training  6. Theraputic activity      TREATMENT PLAN: Frequency/Duration: Follow patient qd to address above goals. Rehabilitation Potential For Stated Goals: GOOD      RECOMMENDED REHABILITATION/EQUIPMENT: (at time of discharge pending progress): Continue Skilled Therapy and Home Health: Physical Therapy. OCCUPATIONAL PROFILE AND HISTORY:   History of Present Injury/Illness (Reason for Referral): Pt presents this date s/p (R) TKA. Past Medical History/Comorbidities:   Ms. Tom Khalil  has a past medical history of Acquired keratoderma; Aortic valve sclerosis; Conduction disorder, unspecified; Contusion of chest wall; Herpes zoster without mention of complication; History of third degree heart block; Hypercholesterolemia; Hypertension; Impaired fasting glucose (9/4/2014); Loss of weight; Mixed hyperlipidemia (9/4/2014); Murmur, cardiac; Osteoarthritis; Osteoporosis, post-menopausal; Other malaise and fatigue; Other primary cardiomyopathies (9/4/2014); PAF (paroxysmal atrial fibrillation) ([facility] (9/20/2013); Paroxysmal a-fib ([facility]  Pes anserinus tendinitis or bursitis; PUD (peptic ulcer disease); Sick sinus syndrome ([facility] Sick sinus syndrome ([facility] (2013); Third degree heart block ([facility] (2013); Unspecified essential hypertension (2014); and Unspecified vitamin D deficiency. She also has no past medical history of Aneurysm ([facility] Coagulation defects; COPD; Difficult intubation; Heart failure ([facility] Malignant hyperthermia due to anesthesia; Morbid obesity ([facility] Nausea & vomiting; Other ill-defined conditions; Pseudocholinesterase deficiency; Psychiatric disorder; Unspecified adverse effect of anesthesia; or Unspecified sleep apnea. Ms. Jun Becerra  has a past surgical history that includes pacemaker placement (, ); tonsillectomy (); appendectomy ();  section; dilation and curettage (multiple ); total abdominal hysterectomy (); breast lumpectomy (); open cholecystectomy (); hammer toe repair (); cholecystectomy (); pacemaker; and orthopaedic (Left).   Social History/Living Environment:   Patient Expects to be Discharged to[de-identified] Other (comment) (to OR)  Prior Level of Function/Work/Activity:  independent   Number of Personal Factors/Comorbidities that affect the Plan of Care: Brief history (0):  LOW COMPLEXITY   ASSESSMENT OF OCCUPATIONAL PERFORMANCE[de-identified]   Most Recent Physical Functioning:   Balance  Sitting: Intact  Standing: With support;Pull to stand        Patient Vitals for the past 6 hrs:       BP BP Patient Position SpO2 O2 Flow Rate (L/min) Pulse   17 0614 - At rest 95 % - 82   17 0624 152/74 - - - -   17 0641 163/78 - 98 % 2 l/min 68   17 0656 175/75 At rest 98 % 2 l/min 70   17 0700 139/63 At rest 95 % 2 l/min 69   17 0705 128/58 - 95 % 2 l/min 69   17 0844 111/54 Supine 98 % 3 l/min 72   17 0849 101/51 - 98 % - 69   17 0854 106/54 - 96 % - 69   17 0859 110/59 - 96 % 3 l/min 69   17 0914 134/60 - 96 % 3 l/min 17 0929 124/60 - 94 % 3 l/min 69 09/21/17 0943 140/59 - 99 % 3 l/min 69   09/21/17 1010 122/62 - 98 % - 62        Gross Assessment: Yes  Gross Assessment  AROM: Within functional limits (BUE)  Strength: Within functional limits (BUE)  Coordination: Within functional limits (BUE)  Tone: Normal  Sensation: Intact                    Vision  Acuity: Within Defined Limits     Mental Status  Neurologic State: Alert  Orientation Level: Oriented X4  Cognition: Follows commands  Perception: Appears intact  Perseveration: No perseveration noted  Safety/Judgement: Awareness of environment; Fall prevention                    Basic ADLs (From Assessment) Complex ADLs (From Assessment)   Basic ADL  Feeding: Setup  Oral Facial Hygiene/Grooming: Setup  Bathing: Moderate assistance  Upper Body Dressing: Setup  Lower Body Dressing: Maximum assistance  Toileting: Maximum assistance     Grooming/Bathing/Dressing Activities of Daily Living     Cognitive Retraining  Safety/Judgement: Awareness of environment; Fall prevention                 Functional Transfers  Toilet Transfer : Minimum assistance;Assist x1  Shower Transfer: Minimum assistance;Assist x1     Bed/Mat Mobility  Supine to Sit: Contact guard assistance  Sit to Supine: Contact guard assistance  Sit to Stand: Minimum assistance;Assist x1  Bed to Chair: Minimum assistance;Assist x1           Physical Skills Involved:  1. Balance  2. Strength  3. Activity Tolerance Cognitive Skills Affected (resulting in the inability to perform in a timely and safe manner): 1. none Psychosocial Skills Affected:  1. none   Number of elements that affect the Plan of Care: 1-3:  LOW COMPLEXITY   CLINICAL DECISION MAKING:   [facility] AM-PAC 6 Clicks   Daily Activity Inpatient Short Form  How much help from another person does the patient currently need. .. Total A Lot A Little None   1. Putting on and taking off regular lower body clothing?   [ ] 1   [X] 2   [ ] 3   [ ] 4   2.   Bathing (including washing, rinsing, drying)? [ ] 1   [X] 2   [ ] 3   [ ] 4   3. Toileting, which includes using toilet, bedpan or urinal?   [ ] 1   [X] 2   [ ] 3   [ ] 4   4. Putting on and taking off regular upper body clothing?   [ ] 1   [ ] 2   [X] 3   [ ] 4   5. Taking care of personal grooming such as brushing teeth? [ ] 1   [ ] 2   [X] 3   [ ] 4   6. Eating meals? [ ] 1   [ ] 2   [X] 3   [ ] 4   © 2007, Trustees of [address] [place] [address] [address] [address], under license to [employer]. All rights reserved   Score:  Initial: 15 Most Recent: X (Date: -- )     Interpretation of Tool:  Represents activities that are increasingly more difficult (i.e. Bed mobility, Transfers, Gait). Score 24 23 22-20 19-15 14-10 9-7 6       Modifier CH CI CJ CK CL CM CN         · Self Care:               - CURRENT STATUS:    CK - 40%-59% impaired, limited or restricted               - GOAL STATUS:           CJ - 20%-39% impaired, limited or restricted               - D/C STATUS:                       ---------------To be determined---------------  Payor: Maryam Simpson / Plan: [address] [place] [address] HMO / Product Type: Managed Care Medicare /       Medical Necessity:     · Patient is expected to demonstrate progress in strength, balance, coordination and functional technique to increase independence with self care and functional mobility. Reason for Services/Other Comments:  · Patient continues to require skilled intervention due to decrease self care and functional mobility.    Use of outcome tool(s) and clinical judgement create a POC that gives a: LOW COMPLEXITY                 TREATMENT:   (In addition to Assessment/Re-Assessment sessions the following treatments were rendered)      Pre-treatment Symptoms/Complaints:  No complaints  Pain: Initial:   Pain Intensity 1: 0  Post Session:  0      Assessment/Reassessment only, no treatment provided today     Treatment/Session Assessment:         Response to Treatment:  Tolerated well     Education:  [ ] Home Exercises  [X] Fall Precautions  [ ] Hip Precautions [ ] Going Home Video  [X] Knee/Hip Prosthesis Review  [X] Walker Management/Safety [X] Adaptive Equipment as Needed         Interdisciplinary Collaboration:   · Physical Therapist  · Occupational Therapist  · Registered Nurse     After treatment position/precautions:   · Supine in bed  · Bed/Chair-wheels locked  · Bed in low position  · Caregiver at bedside  · Call light within reach  · RN notified  · Side rails x 3     Compliance with Program/Exercises: compliant all of the time. Recommendations/Intent for next treatment session:  Treatment next visit will focus on increasing Ms. Render's independence with bed mobility, transfers, self care, functional mobility, self care, modalities for pain, and patient education.        Total Treatment Duration:  OT Patient Time In/Time Out  Time In: 1120  Time Out: 3620 Michael Junior Francheska

## 2017-09-21 NOTE — PROGRESS NOTES
TRANSFER - IN REPORT:    Verbal report received from Isabella Rene RN(name) on [employer]  being received from [employer]) for routine post - op      Report consisted of patients Situation, Background, Assessment and   Recommendations(SBAR). Information from the following report(s) SBAR, Kardex, OR Summary, Procedure Summary, Intake/Output and MAR was reviewed with the receiving nurse. Opportunity for questions and clarification was provided. Assessment completed upon patients arrival to unit and care assumed.

## 2017-09-21 NOTE — PERIOP NOTES
Betadine lavage:  17.5cc of betadine lot #  [id number], exp. Date : 05/19 ,  in 500cc of . 9NS Lot #[id number]  , exp. Date : [id number].

## 2017-09-21 NOTE — ANESTHESIA POSTPROCEDURE EVALUATION
Post-Anesthesia Evaluation and Assessment    Patient: Zena Augustine MRN: [id number]  SSN: [id number]    YOB: 1940  Age: 68 y.o. Sex: female       Cardiovascular Function/Vital Signs  Visit Vitals    /62    Pulse 62    Temp 36.7 °C (98 °F)    Resp 20    Ht 5' 1\" (1.549 m)    Wt 78.9 kg (174 lb)    SpO2 98%    BMI 32.88 kg/m2       Patient is status post spinal anesthesia for Procedure(s):  RIGHT KNEE ARTHROPLASTY TOTAL/AMBER ANCEF 2gm / VANCO. Nausea/Vomiting: None    Postoperative hydration reviewed and adequate. Pain:  Pain Scale 1: Numeric (0 - 10) (09/21/17 1143)  Pain Intensity 1: 0 (09/21/17 1143)   Managed    Neurological Status:   Neuro (WDL): Exceptions to WDL (09/21/17 0844)  Neuro  Neurologic State: Alert (09/21/17 1141)  Orientation Level: Oriented X4 (09/21/17 1141)  Cognition: Follows commands (09/21/17 1141)  LUE Motor Response: Purposeful (09/21/17 1010)  LLE Motor Response: Numbness (09/21/17 1010)  RUE Motor Response: Purposeful (09/21/17 1010)  RLE Motor Response: Numbness (09/21/17 1010)   At baseline    Mental Status and Level of Consciousness: Arousable    Pulmonary Status:   O2 Device: Nasal cannula (09/21/17 0929)   Adequate oxygenation and airway patent    Complications related to anesthesia: None    Post-anesthesia assessment completed.  No concerns    Signed By: Dominick Torrez MD     September 21, 2017

## 2017-09-21 NOTE — PROGRESS NOTES
09/21/17 1230   Oxygen Therapy   O2 Sat (%) 100 %   Pulse via Oximetry 70 beats per minute   O2 Device Nasal cannula   O2 Flow Rate (L/min) 2 l/min  (weaned to room air)   Patient achieved    1500    Ml/sec on IS. Patient encouraged to do every hour while awake-patient agreed and demonstrated. No shortness of breath or distress noted. BS are clear b/l.

## 2017-09-21 NOTE — ANESTHESIA PROCEDURE NOTES
Peripheral Block    Start time: 9/21/2017 6:56 AM  End time: 9/21/2017 6:59 AM  Performed by: Ronald Pack  Authorized by: Ronald Pack       Pre-procedure: Indications: at surgeon's request and post-op pain management    Preanesthetic Checklist: patient identified, risks and benefits discussed, site marked, timeout performed, anesthesia consent given and patient being monitored    Timeout Time: 06:56          Block Type:   Block Type:   Adductor canal  Laterality:  Right  Monitoring:  Standard ASA monitoring, responsive to questions, continuous pulse ox, oxygen, frequent vital sign checks and heart rate  Injection Technique:  Single shot  Procedures: ultrasound guided    Patient Position: supine  Prep: chlorhexidine    Location:  Mid thigh  Needle Type:  Stimuplex  Needle Gauge:  22 G  Needle Localization:  Ultrasound guidance  Medication Injected:  0.5%  ropivacaine  Volume (mL):  20    Assessment:  Number of attempts:  1  Injection Assessment:  Incremental injection every 5 mL, negative aspiration for CSF, ultrasound image on chart, no paresthesia, local visualized surrounding nerve on ultrasound, negative aspiration for blood and no intravascular symptoms  Patient tolerance:  Patient tolerated the procedure well with no immediate complications

## 2017-09-21 NOTE — PERIOP NOTES
TRANSFER - IN REPORT:    Verbal report received from Laura Mckenzie 1729 on [employer]  being received from ortho preop for routine progression of care      Report consisted of patients Situation, Background, Assessment and   Recommendations(SBAR). Information from the following report(s) SBAR was reviewed with the receiving nurse. Opportunity for questions and clarification was provided. Assessment completed upon patients arrival to unit and care assumed.

## 2017-09-21 NOTE — ANESTHESIA PREPROCEDURE EVALUATION
Anesthetic History   No history of anesthetic complications            Review of Systems / Medical History  Patient summary reviewed and pertinent labs reviewed    Pulmonary  Within defined limits                 Neuro/Psych   Within defined limits           Cardiovascular    Hypertension: well controlled        Dysrhythmias (Paroxysmal) : atrial fibrillation  Pacemaker (Pacer dependent) and hyperlipidemia    Exercise tolerance: >4 METS  Comments: SSS  3rd degree heart block  Pacemaker   Nml EF on 50-55%  Denies CP, SOB or changes in functional status   GI/Hepatic/Renal           PUD     Endo/Other        Obesity and arthritis     Other Findings              Physical Exam    Airway  Mallampati: II    Neck ROM: normal range of motion   Mouth opening: Normal     Cardiovascular    Rhythm: regular  Rate: normal         Dental    Dentition: Caps/crowns     Pulmonary  Breath sounds clear to auscultation               Abdominal  GI exam deferred       Other Findings            Anesthetic Plan    ASA: 2  Anesthesia type: spinal      Post-op pain plan if not by surgeon: peripheral nerve block single    Induction: Intravenous  Anesthetic plan and risks discussed with: Patient

## 2017-09-21 NOTE — OP NOTES
[facility]  Total Knee Arthroplasty  Patient:Dora Reina   : 1940  Medical Record [id number]      Pre-operative Diagnosis:  Unilateral primary osteoarthritis, right knee [M17.11]  Post-operative Diagnosis: Unilateral primary osteoarthritis, right knee [M17.11]  Location: [place] [address]    Date of Procedure: 2017  Surgeon: Loly Magana MD  Assistant: Felicitas Lucas PA-C     Anesthesia: Spinal and  nerve block    Procedure:  Right Posterior Stabilized Total Knee Arthroplasty with use of Bone Cement    Tourniquet Time: none    EBL: less than 150cc     The complexity of the total joint surgery requires the use of a first assistant for positioning, retraction and assistance in closure. Yash Reece was brought to the operating room, positioned on the operating room table, and after appropriate identification  was anethestized. A box catheter was placed preoperatively and IV antibiotics were administered, along with IV transexamic acid. The limb was prepped and draped in the usual sterile fashion. Prior to the incision being made a timeout was called identifying the patient, procedure ,operative side and surgeon. An anterior longitudinal incision was accomplished just medial to the tibial tubercle and extending approximal 6 centimeters proximal to the superior pole of the patella. A medial parapatellar capsular incision was performed. The medial capsular flap was elevated around to the insertion of the semimembranous tendon. The patella was everted and the knee flexed and externally rotated. The articular surface revealed cartilage loss with exposed bone and bone spurs throughout all three compartments. The medial and lateral menisci were excised. The lateral half of the fat pad excised and the patella femoral ligament was released. The anterior cruciate ligament and the posterior cruciate ligament were resected.   Using extramedullary instrumentation, the tibial cut was accomplished with appropriate posterior slope. Approximately 6 mm of bone was removed from the high side of the tibia. The distal femur was addressed next. A drill hole was made above the intracondylar notch. Using appropriate intramedullary instrumentation, an appropriate valgus distal cut was accomplished. The femur was sized to a 3 component. The anterior and posterior cuts and anterior and posterior chamfer cuts were then made about the distal femur. Osteophytes were removed from the tibial and femoral surfaces. The appropriate cutting blocks was then utilized to perform the notch cut, with appropriate lateral translation accomplished for the patellofemoral groove. The tibia was sized to a 3 component. The tibial base plate was pinned into place with  appropriate external rotation and the stem site prepared. A preliminary range of motion was accomplished with the above size trial components. A 11 millimeter polyethylene insert allowed the patient to obtain full extension as well as appropriate flexion. Additional surgical releases were none. .  The patient's ligaments were stable in flexion and extension to medial and lateral stressing and alignment was through the appropriate mechanical axis. The patella was then everted. The bone was resected to accomodate a size 31 patella button. A trial reduction revealed appropriate tracking through the patellofemoral groove with no lateral retinacular release accomplished. All trial components were removed and the surfaces were prepared for cementing with irrigation and debridement of the bone interstices. The posterior capsule and periosteum and soft tissues were injected for postop pain management. One package of cement  was mixed and the permanent components cemented into place. The femoral and tibial components were pressurized in full extension as well as 70 degrees of flexion.   The patella component was pressurized using the patella clamp. Excess cement was removed using a curette. Once the cement was hardened, the patella clamp was removed and the knee was copiously irrigated. A lavage of diluted betadine solution of 17.5 ml Betadine in 500 of 0.9% Normal Saline was allowed to soak in the wound for 3 minutes after implanting of the prosthesis. The wound was irrigated with Saline again before closure. Prior to the final skin closure, full strength betadine was applied to the skin surrounding the skin incision. Rip Calhoun A Render's knee was placed through a range of motion and noted to be stable as mentioned above with the trial components. The operative knee was injected prior to closure for post op pain management. The capsular layer was closed using a #1 PDS suture, while the subcutaneous layers were closed using a 2-0 Monocryl interrupted suture. The skin was closed using staples and a sterile bandage was applied. A cryo pad was applied on the operative leg. The sponge count and needle counts were correct. Implants:   Implant Name Type Inv.  Item Serial No.  Lot No. LRB No. Used   CEMENT BNE HV R 40GM -- [employer] - [id number]  CEMENT BNE HV R 40GM -- [employer] [id number] [employer] [id number] Right 1   COMPNT PAT SYM TRIATHLN [id number] --  - [id number]  COMPNT PAT SYM TRIATHLN [id number] --  [id number] [employer] [facility] [id number] Right 1                      Signed By: Carol Easton MD

## 2017-09-22 VITALS
BODY MASS INDEX: 32.85 KG/M2 | DIASTOLIC BLOOD PRESSURE: 75 MMHG | SYSTOLIC BLOOD PRESSURE: 146 MMHG | OXYGEN SATURATION: 95 % | HEIGHT: 61 IN | TEMPERATURE: 97.5 F | WEIGHT: 174 LBS | RESPIRATION RATE: 16 BRPM | HEART RATE: 70 BPM

## 2017-09-22 LAB
ANION GAP SERPL CALC-SCNC: 7 MMOL/L (ref 7–16)
BUN SERPL-MCNC: 17 MG/DL (ref 8–23)
CALCIUM SERPL-MCNC: 7.9 MG/DL (ref 8.3–10.4)
CHLORIDE SERPL-SCNC: 103 MMOL/L (ref 98–107)
CO2 SERPL-SCNC: 26 MMOL/L (ref 21–32)
CREAT SERPL-MCNC: 0.8 MG/DL (ref 0.6–1)
GLUCOSE SERPL-MCNC: 106 MG/DL (ref 65–100)
HGB BLD-MCNC: 10.6 G/DL (ref 11.7–15.4)
POTASSIUM SERPL-SCNC: 3.8 MMOL/L (ref 3.5–5.1)
SODIUM SERPL-SCNC: 136 MMOL/L (ref 136–145)

## 2017-09-22 PROCEDURE — 97150 GROUP THERAPEUTIC PROCEDURES: CPT

## 2017-09-22 PROCEDURE — 36415 COLL VENOUS BLD VENIPUNCTURE: CPT | Performed by: PHYSICIAN ASSISTANT

## 2017-09-22 PROCEDURE — 74011250637 HC RX REV CODE- 250/637: Performed by: ORTHOPAEDIC SURGERY

## 2017-09-22 PROCEDURE — 74011250637 HC RX REV CODE- 250/637: Performed by: PHYSICIAN ASSISTANT

## 2017-09-22 PROCEDURE — 97116 GAIT TRAINING THERAPY: CPT

## 2017-09-22 PROCEDURE — 94760 N-INVAS EAR/PLS OXIMETRY 1: CPT

## 2017-09-22 PROCEDURE — 97535 SELF CARE MNGMENT TRAINING: CPT

## 2017-09-22 PROCEDURE — 80048 BASIC METABOLIC PNL TOTAL CA: CPT | Performed by: PHYSICIAN ASSISTANT

## 2017-09-22 PROCEDURE — 97110 THERAPEUTIC EXERCISES: CPT

## 2017-09-22 PROCEDURE — 85018 HEMOGLOBIN: CPT | Performed by: PHYSICIAN ASSISTANT

## 2017-09-22 RX ADMIN — SENNOSIDES AND DOCUSATE SODIUM 2 TABLET: 8.6; 5 TABLET ORAL at 08:01

## 2017-09-22 RX ADMIN — CELECOXIB 200 MG: 200 CAPSULE ORAL at 08:00

## 2017-09-22 RX ADMIN — OXYCODONE HYDROCHLORIDE 10 MG: 5 TABLET ORAL at 12:03

## 2017-09-22 RX ADMIN — HYDROCHLOROTHIAZIDE: 12.5 CAPSULE ORAL at 08:01

## 2017-09-22 RX ADMIN — REGULAR STRENGTH 325 MG: 325 TABLET ORAL at 08:01

## 2017-09-22 RX ADMIN — OXYCODONE HYDROCHLORIDE 10 MG: 5 TABLET ORAL at 05:46

## 2017-09-22 RX ADMIN — ACETAMINOPHEN 1000 MG: 500 TABLET, FILM COATED ORAL at 12:03

## 2017-09-22 RX ADMIN — CARVEDILOL 12.5 MG: 25 TABLET, FILM COATED ORAL at 08:00

## 2017-09-22 RX ADMIN — ACETAMINOPHEN 1000 MG: 500 TABLET, FILM COATED ORAL at 05:47

## 2017-09-22 RX ADMIN — OXYCODONE HYDROCHLORIDE 10 MG: 5 TABLET ORAL at 08:01

## 2017-09-22 NOTE — PROGRESS NOTES
Problem: Falls - Risk of  Goal: *Absence of Falls  Document Khris Fall Risk and appropriate interventions in the flowsheet. Outcome: Progressing Towards Goal  Fall Risk Interventions:  Mobility Interventions: Patient to call before getting OOB     Mentation Interventions: Adequate sleep, hydration, pain control, Evaluate medications/consider consulting pharmacy     Medication Interventions: Evaluate medications/consider consulting pharmacy, Patient to call before getting OOB, Teach patient to arise slowly     Elimination Interventions: Call light in reach, Elevated toilet seat, Patient to call for help with toileting needs, Toilet paper/wipes in reach     History of Falls Interventions: Utilize gait belt for transfer/ambulation           Problem: Falls - Risk of  Goal: *Absence of Falls  Document Khris Fall Risk and appropriate interventions in the flowsheet.    Outcome: Progressing Towards Goal  Fall Risk Interventions:  Mobility Interventions: Patient to call before getting OOB     Mentation Interventions: Adequate sleep, hydration, pain control, Evaluate medications/consider consulting pharmacy     Medication Interventions: Evaluate medications/consider consulting pharmacy, Patient to call before getting OOB, Teach patient to arise slowly     Elimination Interventions: Call light in reach, Elevated toilet seat, Patient to call for help with toileting needs, Toilet paper/wipes in reach     History of Falls Interventions: Utilize gait belt for transfer/ambulation

## 2017-09-22 NOTE — DISCHARGE INSTRUCTIONS
[facility]   Patient Discharge Instructions    Juan [facility] / [id number] : 1940    Admitted 2017 Discharged: 2017     IF YOU HAVE ANY PROBLEMS ONCE YOU ARE AT HOME CALL THE FOLLOWING NUMBERS:   Main office number: [phone]    Take Home Medications     · It is important that you take the medication exactly as they are prescribed. · Keep your medication in the bottles provided by the pharmacist and keep a list of the medication names, dosages, and times to be taken in your wallet. · Do not take other medications without consulting your doctor. What to do at [address] [place], [place] [address] Ave your prehospital diet. If you have excessive nausea or vomitting call your doctor's office     Home Physical Therapy is arranged. Use rolling walker when walking. Use Miguelito Hose stockings until we see you in the office for your follow up appointment with Dr. Cachorro Donald    Patients who have had a joint replacement should not drive until you are seen for your follow up appointment by Dr. Cachorro Donald. When to Call    - Call if you have a temperature greater then 101  - Unable to keep food down  - Loose control of your bladder or bowel function  - Are unable to bear any weight   - Need a pain medication refill       DISCHARGE SUMMARY from Nurse    The following personal items collected during your admission are returned to you:   Dental Appliance: Dental Appliances: None  Vision: Visual Aid: Glasses  Hearing Aid:   na  Jewelry: Jewelry: Ring (wedding band left hand taped)  Clothing: Clothing: Other (comment) (in car or with )  Other Valuables:  Other Valuables: Cell Phone ( has)  Valuables sent to safe:   na    PATIENT INSTRUCTIONS:    After general anesthesia or intravenous sedation, for 24 hours or while taking prescription Narcotics:  · Limit your activities  · Do not drive and operate hazardous machinery  · Do not make important personal or business decisions  · Do  not drink alcoholic beverages  · If you have not urinated within 8 hours after discharge, please contact your surgeon on call. Report the following to your surgeon:  · Excessive pain, swelling, redness or odor of or around the surgical area  · Temperature over 101  · Nausea and vomiting lasting longer than 4 hours or if unable to take medications  · Any signs of decreased circulation or nerve impairment to extremity: change in color, persistent  numbness, tingling, coldness or increase pain  · Any questions, call office @ [phone]      Keep scheduled follow up appointment. If need to change, call office @ [phone]. *  Please give a list of your current medications to your Primary Care Provider. *  Please update this list whenever your medications are discontinued, doses are      changed, or new medications (including over-the-counter products) are added. *  Please carry medication information at all times in case of emergency situations. Total Knee Replacement: What to Expect at [address] [place] [address]    When you leave the hospital, you should be able to move around with a walker or crutches. But you will need someone to help you at home for the next few weeks or until you have more energy and can move around better. If there is no one to help you at home, you may go to a rehabilitation center. You will go home with a bandage and stitches or staples. Change the bandage as your doctor tells you to. Your doctor will remove your stitches or staples 10 to 21 days after your surgery. You may still have some mild pain, and the area may be swollen for 3 to 6 months after surgery. Your knee will continue to improve for 6 to 12 months. You will probably use a walker for 1 to 3 weeks and then use crutches. When you are ready, you can use a cane. You will probably be able to walk on your own in 4 to 8 weeks. You will need to do months of physical rehabilitation (rehab) after a knee replacement.  Rehab will help you strengthen the muscles of the knee and help you regain movement. After you recover, your artificial knee will allow you to do normal daily activities with less pain or no pain at all. You may be able to hike, dance, ride a bike, and play golf. Talk to your doctor about whether you can do more strenuous activities. Always tell your caregivers that you have an artificial knee. How long it will take to walk on your own, return to normal activities, and go back to work depends on your health and how well your rehabilitation (rehab) program goes. The better you do with your rehab exercises, the quicker you will get your strength and movement back. This care sheet gives you a general idea about how long it will take for you to recover. But each person recovers at a different pace. Follow the steps below to get better as quickly as possible. How can you care for yourself at home? Activity  · Rest when you feel tired. You may take a nap, but do not stay in bed all day. When you sit, use a chair with arms. You can use the arms to help you stand up. · Work with your physical therapist to find the best way to exercise. You may be able to take frequent, short walks using crutches or a walker. What you can do as your knee heals will depend on whether your new knee is cemented or uncemented. You may not be able to do certain things for a while if your new knee is uncemented. · After your knee has healed enough, you can do more strenuous activities with caution. ¨ You can golf, but use a golf cart, and do not wear shoes with spikes. ¨ You can bike on a flat road or on a stationary bike. Avoid biking up hills. ¨ Your doctor may suggest that you stay away from activities that put stress on your knee. These include tennis or badminton, squash or racquetball, contact sports like football, jumping (such as in basketball), jogging, or running. ¨ Avoid activities where you might fall.  These include horseback riding, skiing, and mountain biking. · Do not sit for more than 1 hour at a time. Get up and walk around for a while before you sit again. If you must sit for a long time, prop up your leg with a chair or footstool. This will help you avoid swelling. · Ask your doctor when you can shower. You may need to take sponge baths until your stitches or staples have been removed. · Ask your doctor when you can drive again. It may take up to 8 weeks after knee replacement surgery before it is safe for you to drive. · When you get into a car, sit on the edge of the seat. Then pull in your legs, and turn to face the front. · You should be able to do many everyday activities 3 to 6 weeks after your surgery. You will probably need to take 4 to 16 weeks off from work. When you can go back to work depends on the type of work you do and how you feel. · Ask your doctor when it is okay for you to have sex. · Do not lift anything heavier than 10 pounds and do not lift weights for 12 weeks. Diet  · By the time you leave the hospital, you should be eating your normal diet. If your stomach is upset, try bland, low-fat foods like plain rice, broiled chicken, toast, and yogurt. Your doctor may suggest that you take iron and vitamin supplements. · Drink plenty of fluids (unless your doctor tells you not to). · Eat healthy foods, and watch your portion sizes. Try to stay at your ideal weight. Too much weight puts more stress on your new knee. · You may notice that your bowel movements are not regular right after your surgery. This is common. Try to avoid constipation and straining with bowel movements. You may want to take a fiber supplement every day. If you have not had a bowel movement after a couple of days, ask your doctor about taking a mild laxative. Medicines  · Your doctor will tell you if and when you can restart your medicines. He or she will also give you instructions about taking any new medicines.   · If you take blood thinners, such as warfarin (Coumadin), clopidogrel (Plavix), or aspirin, be sure to talk to your doctor. He or she will tell you if and when to start taking those medicines again. Make sure that you understand exactly what your doctor wants you to do. · Your doctor may give you a blood-thinning medicine to prevent blood clots. If you take a blood thinner, be sure you get instructions about how to take your medicine safely. Blood thinners can cause serious bleeding problems. This medicine could be in pill form or as a shot (injection). If a shot is necessary, your doctor will tell you how to do this. · Be safe with medicines. Take pain medicines exactly as directed. ¨ If the doctor gave you a prescription medicine for pain, take it as prescribed. ¨ If you are not taking a prescription pain medicine, ask your doctor if you can take an over-the-counter medicine. ¨ Plan to take your pain medicine 30 minutes before exercises. It is easier to prevent pain before it starts than to stop it once it has started. · If you think your pain medicine is making you sick to your stomach:  ¨ Take your medicine after meals (unless your doctor has told you not to). ¨ Ask your doctor for a different pain medicine. · If your doctor prescribed antibiotics, take them as directed. Do not stop taking them just because you feel better. You need to take the full course of antibiotics. Incision care  · You will have a bandage over the cut (incision) and staples or stitches. Take the bandage off when your doctor says it is okay. · Your doctor will remove the staples or stitches 10 days to 3 weeks after the surgery and replace them with strips of tape. Leave the tape on for a week or until it falls off. Exercise  · Your rehab program will give you a number of exercises to do to help you get back your knee's range of motion and strength. Always do them as your therapist tells you.   Ice and elevation  · For pain and swelling, put ice or a cold pack on the area for 10 to 20 minutes at a time. Put a thin cloth between the ice and your skin. Other instructions  · Continue to wear your support stockings as your doctor says. These help to prevent blood clots. The length of time that you will have to wear them depends on your activity level and the amount of swelling. · Wear medical alert jewelry that says you may need antibiotics before any procedure, including dental work. You can buy this at most drugstores. · You have metal pieces in your knee. These may set off some airport metal detectors. Carry a medical alert card that says you have an artificial joint, just in case. Follow-up care is a key part of your treatment and safety. Be sure to make and go to all appointments, and call your doctor if you are having problems. It's also a good idea to know your test results and keep a list of the medicines you take. When should you call for help? Call 911 anytime you think you may need emergency care. For example, call if:  · You passed out (lost consciousness). · You have severe trouble breathing. · You have sudden chest pain and shortness of breath, or you cough up blood. Call your doctor now or seek immediate medical care if:  · You have signs of infection, such as:  ¨ Increased pain, swelling, warmth, or redness. ¨ Red streaks leading from the incision. ¨ Pus draining from the incision. ¨ A fever. · You have signs of a blood clot, such as:  ¨ Pain in your calf, back of the knee, thigh, or groin. ¨ Redness and swelling in your leg or groin. · Your incision comes open and begins to bleed, or the bleeding increases. · You have pain that does not get better after you take pain medicine. Watch closely for changes in your health, and be sure to contact your doctor if:  · You do not have a bowel movement after taking a laxative. Where can you learn more? Go to http://erica-maranda.info/.   Enter N345 in the search box to learn more about \"Total Knee Replacement: What to Expect at Home. \"  Current as of: March 21, 2017  Content Version: 11.3  © 4418-8616 [employer]. Care instructions adapted under license by [employer] (which disclaims liability or warranty for this information). If you have questions about a medical condition or this instruction, always ask your healthcare professional. [address] any warranty or liability for your use of this information. These are general instructions for a healthy lifestyle:    No smoking/ No tobacco products/ Avoid exposure to second hand smoke    Surgeon General's Warning:  Quitting smoking now greatly reduces serious risk to your health. Obesity, smoking, and sedentary lifestyle greatly increases your risk for illness    A healthy diet, regular physical exercise & weight monitoring are important for maintaining a healthy lifestyle    You may be retaining fluid if you have a history of heart failure or if you experience any of the following symptoms:  Weight gain of 3 pounds or more overnight or 5 pounds in a week, increased swelling in our hands or feet or shortness of breath while lying flat in bed. Please call your doctor as soon as you notice any of these symptoms; do not wait until your next office visit. Recognize signs and symptoms of STROKE:    F-face looks uneven    A-arms unable to move or move even    S-speech slurred or non-existent    T-time-call 911 as soon as signs and symptoms begin-DO NOT go       Back to bed or wait to see if you get better-TIME IS BRAIN. The discharge information has been reviewed with the patient. The patient verbalized understanding. Information obtained by :  I understand that if any problems occur once I am at home I am to contact my physician. I understand and acknowledge receipt of the instructions indicated above. Physician's or R.N.'s Signature                                                                  Date/Time                                                                                                                                              Patient or Representative Signature                                                          Date/Time

## 2017-09-22 NOTE — PROGRESS NOTES
Care Management Interventions  Mode of Transport at Discharge: Self  Transition of Care Consult (CM Consult): [address]: Yes  Discharge Durable Medical Equipment: No  Physical Therapy Consult: Yes  Occupational Therapy Consult: Yes  Current Support Network: Lives with Spouse  Confirm Follow Up Transport: Family  Plan discussed with Pt/Family/Caregiver: Yes  Freedom of Choice Offered: Yes  Discharge Location  Discharge Placement: Home with home health    Patient is a 68y.o. year old female admitted for Right TKA . Patient lives with Her spouse and plans to return home on discharge. Order received to arrange home health. Patient requesting [facility] due to a PT/friend's recommendation  Referral sent to [facility] and referral accepted . Patient denies any equipment needs as she has a walker and bedside commode. . Will follow until discharge.   Amelie Holt

## 2017-09-22 NOTE — PROGRESS NOTES
Pt rating her pain 5/10,  10 mg oxycodone given PO. Dressing to right knee clean dry and intact,  NV status WNL's with strong push pulls bilaterally.  at the bedside.   Bed in low locked position and call light within reach

## 2017-09-22 NOTE — PROGRESS NOTES
2017         Post Op day: 1 Day Post-Op   Admit Diagnosis: Unilateral primary osteoarthritis, right knee [M17.11]  LAB:    Recent Results (from the past 24 hour(s))   HEMOGLOBIN    Collection Time: 17  8:50 PM   Result Value Ref Range    HGB 12.0 11.7 - 15.4 g/dL   HEMOGLOBIN    Collection Time: 17  5:30 AM   Result Value Ref Range    HGB 10.6 (L) 11.7 - 29.5 g/dL   METABOLIC PANEL, BASIC    Collection Time: 17  5:30 AM   Result Value Ref Range    Sodium 136 136 - 145 mmol/L    Potassium 3.8 3.5 - 5.1 mmol/L    Chloride 103 98 - 107 mmol/L    CO2 26 21 - 32 mmol/L    Anion gap 7 7 - 16 mmol/L    Glucose 106 (H) 65 - 100 mg/dL    BUN 17 8 - 23 MG/DL    Creatinine 0.80 0.6 - 1.0 MG/DL    GFR est AA >60 >60 ml/min/1.73m2    GFR est non-AA >60 >60 ml/min/1.73m2    Calcium 7.9 (L) 8.3 - 10.4 MG/DL     Vital Signs:    Patient Vitals for the past 8 hrs:   BP Temp Pulse Resp SpO2   17 0338 152/77 98 °F (36.7 °C) 62 16 95 %     Temp (24hrs), Av °F (36.7 °C), Min:97.6 °F (36.4 °C), Max:99 °F (37.2 °C)    Pain Control:   Pain Assessment  Pain Scale 1: Numeric (0 - 10)  Pain Intensity 1: 6  Subjective: Doing well, no complaints     Objective:  No Acute Distress, Alert and Oriented, Neurovascular exam is normal       Assessment:   Patient Active Problem List   Diagnosis Code    Osteoarthritis of left knee M17.12    S/P total knee arthroplasty Z96.659    PAF (paroxysmal atrial fibrillation) (HCC) I48.0    Sick sinus syndrome (HCC) I49.5    Third degree heart block (HCC) I44.2    Essential hypertension I10    Mixed hyperlipidemia E78.2    Disorder of bone and cartilage, unspecified M89.9, M94.9    Impaired fasting glucose R73.01    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 Home based on PT.    Signed By: Zach Guerra MD

## 2017-09-22 NOTE — PROGRESS NOTES
Problem: Self Care Deficits Care Plan (Adult)  Goal: *Acute Goals and Plan of Care (Insert Text)  GOALS:   DISCHARGE GOALS (in preparation for going home/rehab): 3 days  1. Ms. Kandice Rodas will perform one lower body dressing activity with minimal assistance required to demonstrate improved functional mobility and safety. (MET 9/22/2017)  2. Ms. Kandice Rodas will perform one lower body bathing activity with minimal assistance required to demonstrate improved functional mobility and safety. (MET 9/22/2017)  3. Ms. Kandice Rodas will perform toileting/toilet transfer with contact guard assistance to demonstrate improved functional mobility and safety. (MET 9/22/2017)  4. Ms. Kandice Rodas will perform shower transfer with contact guard assistance to demonstrate improved functional mobility and safety. (MET 9/22/2017)      JOINT CAMP OCCUPATIONAL THERAPY TKA: Daily Note 9/22/2017  INPATIENT: Hospital Day: 2  Payor: Dalia Carrillo / Plan: [address] [place] [address] HMO / Product Type: [employer] /      NAME/AGE/GENDER: Art Kerns is a 68 y.o. female              PRIMARY DIAGNOSIS:  Unilateral primary osteoarthritis, right knee [M17.11]              Procedure(s) and Anesthesia Type:     * RIGHT KNEE ARTHROPLASTY TOTAL/AMBER ANCEF 2gm / Marthena Bruins - Spinal (Right)  ICD-10: Treatment Diagnosis:        · Pain in Right Knee (M25.561)  · Stiffness of Right Knee, Not elsewhere classified (M25.661)       ASSESSMENT:       Ms. Kandice Rodas is s/p R TKA and presents with decreased weight bearing on R LE and decreased independence with functional mobility and activities of daily living. Patient completed shower and dressing as charter below in ADL grid and is ambulating with rolling walker and contact guard assist.  Patient has met 4/4 goals and plans to return home with good family support. Family able to provide patient with appropriate level of assistance at this time.   OT reviewed safety precautions throughout session and therapy schedule for the remainder of today. Patient instructed to call for assistance when needing to get up from recliner and all needs in reach. Patient verbalized understanding of call light. This section established at most recent assessment   PROBLEM LIST (Impairments causing functional limitations):  1. Decreased Strength  2. Decreased ADL/Functional Activities  3. Decreased Transfer Abilities  4. Increased Pain  5. Increased Fatigue  6. Decreased Flexibility/Joint Mobility  7. Decreased Knowledge of Precautions    INTERVENTIONS PLANNED: (Benefits and precautions of occupational therapy have been discussed with the patient.)  1. Activities of daily living training  2. Adaptive equipment training  3. Balance training  4. Clothing management  5. Donning&doffing training  6. Theraputic activity      TREATMENT PLAN: Frequency/Duration: Follow patient qd to address above goals. Rehabilitation Potential For Stated Goals: GOOD      RECOMMENDED REHABILITATION/EQUIPMENT: (at time of discharge pending progress): Continue Skilled Therapy and Home Health: Physical Therapy. OCCUPATIONAL PROFILE AND HISTORY:   History of Present Injury/Illness (Reason for Referral): Pt presents this date s/p (R) TKA. Past Medical History/Comorbidities:   Ms. Martin Purdy  has a past medical history of Acquired keratoderma; Aortic valve sclerosis; Conduction disorder, unspecified; Contusion of chest wall; Herpes zoster without mention of complication; History of third degree heart block; Hypercholesterolemia; Hypertension; Impaired fasting glucose (9/4/2014); Loss of weight; Mixed hyperlipidemia (9/4/2014); Murmur, cardiac; Osteoarthritis; Osteoporosis, post-menopausal; Other malaise and fatigue; Other primary cardiomyopathies (9/4/2014); PAF (paroxysmal atrial fibrillation) ([address] (9/20/2013); Paroxysmal a-fib ([address] Pes anserinus tendinitis or bursitis; PUD (peptic ulcer disease); Sick sinus syndrome ([address]  Sick sinus syndrome ([address] (2013); Third degree heart block ([facility] (2013); Unspecified essential hypertension (2014); and Unspecified vitamin D deficiency. She also has no past medical history of Aneurysm ([address] Coagulation defects; COPD; Difficult intubation; Heart failure ([address] Malignant hyperthermia due to anesthesia; Morbid obesity ([address] Nausea & vomiting; Other ill-defined conditions; Pseudocholinesterase deficiency; Psychiatric disorder; Unspecified adverse effect of anesthesia; or Unspecified sleep apnea. Ms. Axel Andres  has a past surgical history that includes pacemaker placement (, ); tonsillectomy (); appendectomy ();  section; dilation and curettage (multiple ); total abdominal hysterectomy (); breast lumpectomy (); open cholecystectomy (); hammer toe repair (); cholecystectomy (); pacemaker; and orthopaedic (Left).   Social History/Living Environment:   Home Environment: Private residence  # Steps to Enter: 5  Hand Rails : Right  One/Two Story Residence: Two story, live on 1st floor  # of Interior Steps: 10  Interior Rails: Left  Living Alone: No  Support Systems: Spouse/Significant Other/Partner  Patient Expects to be Discharged to[de-identified] Private residence  Current DME Used/Available at Home: Walker, rolling  Tub or Shower Type: Shower  Prior Level of Function/Work/Activity:  independent   Number of Personal Factors/Comorbidities that affect the Plan of Care: Brief history (0):  LOW COMPLEXITY   ASSESSMENT OF OCCUPATIONAL PERFORMANCE[de-identified]   Most Recent Physical Functioning:   Balance  Sitting: Intact  Standing: With support        Patient Vitals for the past 6 hrs:   BP BP Patient Position SpO2 Pulse   17 0726 146/75 At rest 95 % 70                                   Mental Status  Neurologic State: Alert  Orientation Level: Appropriate for age  Cognition: Appropriate decision making  Perception: Appears intact  Perseveration: No perseveration noted  Safety/Judgement: Awareness of environment                    Basic ADLs (From Assessment) Complex ADLs (From Assessment)   Basic ADL  Feeding: Setup  Oral Facial Hygiene/Grooming: Setup  Bathing: Moderate assistance  Upper Body Dressing: Setup  Lower Body Dressing: Maximum assistance  Toileting: Maximum assistance     Grooming/Bathing/Dressing Activities of Daily Living   Grooming  Grooming Assistance: Supervision/set up Cognitive Retraining  Maintains Attention For (Time): Greater than 10 minutes  Safety/Judgement: Awareness of environment   Upper Body Bathing  Bathing Assistance: Supervision/set-up Feeding  Feeding Assistance: Independent   [address]: Minimum assistance (assistance to wash BLE's)  Adaptive Equipment: Grab bar; Shower chair Toileting  Toileting Assistance: Contact guard assistance  Adaptive Equipment: Walker;Grab bars;Elevated seat   Upper Body Dressing Assistance  Dressing Assistance: Supervision/set-up Functional Transfers  Bathroom Mobility: Contact guard assistance  Toilet Transfer : Contact guard assistance  Shower Transfer: Contact guard assistance  Adaptive Equipment: Walker (comment);Grab bars; Shower chair with back   Lower Body Dressing Assistance  Dressing Assistance: Minimum assistance  Antiembolitic Stockings: Total assistance (dependent)  Shoes with Cloth Laces: Moderate assistance  Adaptive Equipment Used: Walker;Grab bar Bed/Mat Mobility  Sit to Stand: Stand-by asssistance  Bed to Chair: Stand-by asssistance           Physical Skills Involved:  1. Balance  2. Strength  3. Activity Tolerance Cognitive Skills Affected (resulting in the inability to perform in a timely and safe manner): 1. none Psychosocial Skills Affected:  1. none   Number of elements that affect the Plan of Care: 1-3:  LOW COMPLEXITY   CLINICAL DECISION MAKIN [facility] [address] [address] AM-PAC 6 Clicks   Daily Activity Inpatient Short Form  How much help from another person does the patient currently need. .. Total A Lot A Little None   1. Putting on and taking off regular lower body clothing?   [ ] 1   [X] 2   [ ] 3   [ ] 4   2. Bathing (including washing, rinsing, drying)? [ ] 1   [X] 2   [ ] 3   [ ] 4   3. Toileting, which includes using toilet, bedpan or urinal?   [ ] 1   [X] 2   [ ] 3   [ ] 4   4. Putting on and taking off regular upper body clothing?   [ ] 1   [ ] 2   [X] 3   [ ] 4   5. Taking care of personal grooming such as brushing teeth? [ ] 1   [ ] 2   [X] 3   [ ] 4   6. Eating meals? [ ] 1   [ ] 2   [X] 3   [ ] 4   © 2007, Trustees of [address] [place] [address] [address] [address], under license to [employer]. All rights reserved   Score:  Initial: 15 Most Recent: X (Date: -- )     Interpretation of Tool:  Represents activities that are increasingly more difficult (i.e. Bed mobility, Transfers, Gait). Score 24 23 22-20 19-15 14-10 9-7 6       Modifier CH CI CJ CK CL CM CN         · Self Care:               - CURRENT STATUS:    CK - 40%-59% impaired, limited or restricted               - GOAL STATUS:           CJ - 20%-39% impaired, limited or restricted               - D/C STATUS:                       ---------------To be determined---------------  Payor: Zurdo Richardson / Plan: [address] [place] [address] HMO / Product Type: Managed Care Medicare /       Medical Necessity:     · Patient is expected to demonstrate progress in strength, balance, coordination and functional technique to increase independence with self care and functional mobility. Reason for Services/Other Comments:  · Patient continues to require skilled intervention due to decrease self care and functional mobility.    Use of outcome tool(s) and clinical judgement create a POC that gives a: LOW COMPLEXITY                 TREATMENT:   (In addition to Assessment/Re-Assessment sessions the following treatments were rendered)      Pre-treatment Symptoms/Complaints:  No complaints  Pain: Initial: 2/10     Post Session:  3/10      Self Care: (45 minutes): Procedure(s) (per grid) utilized to improve and/or restore self-care/home management as related to dressing, bathing, toileting and grooming. Required minimal verbal, tactile and   cueing to facilitate activities of daily living skills and compensatory activities. Treatment/Session Assessment:         Response to Treatment:  Tolerated well     Education:  [ ] Home Exercises  [X] Fall Precautions  [ ] Hip Precautions [ ] [address] Video  [X] Knee/Hip Prosthesis Review  [X] Walker Management/Safety [X] Adaptive Equipment as Needed         Interdisciplinary Collaboration:   · Physical Therapist  · Occupational Therapist  · Registered Nurse     After treatment position/precautions:   · Up in chair, Bed/Chair-wheels locked, Caregiver at bedside and Call light within reach     Compliance with Program/Exercises: compliant all of the time. Recommendations/Intent for next treatment session:  Treatment next visit will focus on increasing Ms. Render's independence with bed mobility, transfers, self care, functional mobility, self care, modalities for pain, and patient education.        Total Treatment Duration:  OT Patient Time In/Time Out  Time In: 0058  Time Out: [address]/L

## 2017-09-22 NOTE — PROGRESS NOTES
Problem: Mobility Impaired (Adult and Pediatric)  Goal: *Acute Goals and Plan of Care (Insert Text)  GOALS (1-4 days):  (1.)Ms. Reina will move from supine to sit and sit to supine in bed with STAND BY ASSIST.  (2.)Ms. Reina will transfer from bed to chair and chair to bed with STAND BY ASSIST using the least restrictive device. Goal met 9/22/17. (3.)Ms. Reina will ambulate with STAND BY ASSIST for 200 feet with the least restrictive device. Goal met 9/22/17. (4.)Ms. Reina will ambulate up/down 3 steps with bilateral railing with CONTACT GUARD ASSIST with no device. Goal met 9/22/17. (5.)Ms. Reina will increase right knee ROM to 5°-80°. Goal met for flexion 9/22/17.  ________________________________________________________________________________________________      PHYSICAL THERAPY JOINT CAMP TKA: Daily Note, Treatment Day: 1st and AM 9/22/2017  INPATIENT: Hospital Day: 2  Payor: Gabriel Eisenmenger / Plan: [address] HMO / Product Type: Managed Care Medicare /      NAME/AGE/GENDER: Medina Stevens is a 68 y.o. female              PRIMARY DIAGNOSIS:  Unilateral primary osteoarthritis, right knee [M17.11]              Procedure(s) and Anesthesia Type:     * RIGHT KNEE ARTHROPLASTY TOTAL/AMBER ANCEF 2gm / Ettie Boozer - Spinal (Right)  ICD-10: Treatment Diagnosis:        · Pain in Right Knee (M25.561)  · Stiffness of Right Knee, Not elsewhere classified (M25.661)  · Difficulty in walking, Not elsewhere classified (R26.2)  · Other abnormalities of gait and mobility (R26.89)       ASSESSMENT:      Ms. Jeyson Casillas presents with decreased strength and ROM R LE and limited functional mobility S/P R TKA. Patient had L TKA in 2014. She demonstrates increased gait distance and increased R knee flexion this afternoon. Good goal achievement today. This section established at most recent assessment   PROBLEM LIST (Impairments causing functional limitations):  1. Decreased Strength  2.  Decreased ADL/Functional Activities  3. Decreased Transfer Abilities  4. Decreased Ambulation Ability/Technique  5. Decreased Flexibility/Joint Mobility  6. Decreased [place] with Home Exercise Program    INTERVENTIONS PLANNED: (Benefits and precautions of physical therapy have been discussed with the patient.)  1. Bed Mobility  2. Gait Training  3. Home Exercise Program (HEP)  4. Therapeutic Exercise/Strengthening  5. Transfer Training  6. Range of Motion: active/assisted/passive  7. Therapeutic Activities  8. Group Therapy      TREATMENT PLAN: Frequency/Duration: Follow patient BID   to address above goals. Rehabilitation Potential For Stated Goals: GOOD      RECOMMENDED REHABILITATION/EQUIPMENT: (at time of discharge pending progress): Continue Skilled Therapy and Home Health: Physical Therapy. HISTORY:   History of Present Injury/Illness (Reason for Referral):  S/P R TKA  Past Medical History/Comorbidities:   Ms. Jazmyne Knowles  has a past medical history of Acquired keratoderma; Aortic valve sclerosis; Conduction disorder, unspecified; Contusion of chest wall; Herpes zoster without mention of complication; History of third degree heart block; Hypercholesterolemia; Hypertension; Impaired fasting glucose (9/4/2014); Loss of weight; Mixed hyperlipidemia (9/4/2014); Murmur, cardiac; Osteoarthritis; Osteoporosis, post-menopausal; Other malaise and fatigue; Other primary cardiomyopathies (9/4/2014); PAF (paroxysmal atrial fibrillation) ([address] (9/20/2013); Paroxysmal a-fib ([address] Pes anserinus tendinitis or bursitis; PUD (peptic ulcer disease); Sick sinus syndrome ([address] Sick sinus syndrome ([address] (9/20/2013); Third degree heart block ([address] (9/20/2013); Unspecified essential hypertension (9/4/2014); and Unspecified vitamin D deficiency. She also has no past medical history of Aneurysm ([address] Coagulation defects; COPD; Difficult intubation; Heart failure ([address] Malignant hyperthermia due to anesthesia;  Morbid obesity ([address] Nausea & vomiting; Other ill-defined conditions; Pseudocholinesterase deficiency; Psychiatric disorder; Unspecified adverse effect of anesthesia; or Unspecified sleep apnea. Ms. Yazmin Figueroa  has a past surgical history that includes pacemaker placement (, ); tonsillectomy (); appendectomy ();  section; dilation and curettage (multiple 1960s); total abdominal hysterectomy (); breast lumpectomy (); open cholecystectomy (); hammer toe repair (); cholecystectomy (); pacemaker; and orthopaedic (Left). Social History/Living Environment:   Home Environment: Private residence  # Steps to Enter: 5  Hand Rails : Right  One/Two Story Residence: Two story, live on 1st floor  # of Interior Steps: 10  Interior Rails: Left  Living Alone: No  Support Systems: Spouse/Significant Other/Partner  Patient Expects to be Discharged to[de-identified] Private residence  Current DME Used/Available at Home: Walker, rolling  Tub or Shower Type: Shower  Prior Level of Function/Work/Activity:  Independent with gait and ADLs. Number of Personal Factors/Comorbidities that affect the Plan of Care: 1-2: MODERATE COMPLEXITY   EXAMINATION:   Most Recent Physical Functioning:                RLE AROM  R Knee Flexion: 85  R Knee Extension: -6                    Transfers  Sit to Stand: Stand-by asssistance  Stand to Sit: Stand-by asssistance  Bed to Chair: Stand-by asssistance     Balance  Sitting: Intact  Standing: With support                Weight Bearing Status  Right Side Weight Bearing: As tolerated  Distance (ft): 234 Feet (ft)  Ambulation - Level of Assistance: Stand-by asssistance  Assistive Device: Walker, rolling  Speed/Helen: Slow  Stance: Right decreased  Gait Abnormalities: Antalgic  Number of Stairs Trained: 3  Stairs - Level of Assistance: Contact guard assistance  Rail Use: Both  Interventions: Manual cues; Safety awareness training;Verbal cues      Braces/Orthotics: none     Right Knee Cold  Type: Cryocuff Body Structures Involved:  1. Bones  2. Joints  3. Muscles Body Functions Affected:  1. Neuromusculoskeletal  2. Movement Related Activities and Participation Affected:  1. General Tasks and Demands  2. Mobility  3. Self Care   Number of elements that affect the Plan of Care: 3: MODERATE COMPLEXITY   CLINICAL PRESENTATION:   Presentation: Stable and uncomplicated: LOW COMPLEXITY   CLINICAL DECISION MAKIN[address] [place] [address] AM-PAC 6 Clicks   Basic Mobility Inpatient Short Form  How much difficulty does the patient currently have. .. Unable A Lot A Little None   1. Turning over in bed (including adjusting bedclothes, sheets and blankets)? [ ] 1   [ ] 2   [X] 3   [ ] 4   2. Sitting down on and standing up from a chair with arms ( e.g., wheelchair, bedside commode, etc.)   [ ] 1   [ ] 2   [X] 3   [ ] 4   3. Moving from lying on back to sitting on the side of the bed? [ ] 1   [ ] 2   [X] 3   [ ] 4   How much help from another person does the patient currently need. .. Total A Lot A Little None   4. Moving to and from a bed to a chair (including a wheelchair)? [ ] 1   [ ] 2   [X] 3   [ ] 4   5. Need to walk in hospital room? [ ] 1   [ ] 2   [X] 3   [ ] 4   6. Climbing 3-5 steps with a railing? [ ] 1   [X] 2   [ ] 3   [ ] 4   © , Trustees of [address] [place] [address], under license to [employer]. All rights reserved       Score:  Initial: 17 Most Recent: X (Date: -- )     Interpretation of Tool:  Represents activities that are increasingly more difficult (i.e. Bed mobility, Transfers, Gait).        Score 24 23 22-20 19-15 14-10 9-7 6       Modifier CH CI CJ CK CL CM CN         · Mobility - Walking and Moving Around:               - CURRENT STATUS:    CK - 40%-59% impaired, limited or restricted               - GOAL STATUS:           CJ - 20%-39% impaired, limited or restricted               - D/C STATUS:                       ---------------To be determined---------------  Payor: [employer] / Plan: [address] HMO / Product Type: Managed Care Medicare /       Medical Necessity:     · Patient demonstrates good rehab potential due to higher previous functional level. Reason for Services/Other Comments:  · Patient continues to require present interventions due to patient's inability to perform exercises and functional mobility independently. Use of outcome tool(s) and clinical judgement create a POC that gives a: Clear prediction of patient's progress: LOW COMPLEXITY                 TREATMENT:   (In addition to Assessment/Re-Assessment sessions the following treatments were rendered)      Pre-treatment Symptoms/Complaints:  R knee  pain  Pain: Initial:   Pain Intensity 1: 4  Pain Location 1: Knee  Pain Orientation 1: Right  Pain Intervention(s) 1: Cold pack, Repositioned  Post Session:  4      Gait Training (15 Minutes):  Gait training to improve and/or restore physical functioning as related to dynamic movement of knee - right to improve functional gait pattern. Ambulated 234 Feet (ft) with Stand-by asssistance using a Walker, rolling and minimal Manual cues; Safety awareness training;Verbal cues related to their knee position and motion to promote proper body mechanics. Therapeutic Exercise: (45 Minutes (group)):  Exercises per grid below to improve mobility and strength. Required minimal verbal and manual cues to perform exercises correctly. Progressed range and repetitions as indicated.                 Date:   9/22/17 Date:    Date:      ACTIVITY/EXERCISE AM PM AM PM AM PM   GROUP THERAPY  [ ]  [x ]  [ ]  [ ]  [ ]  [ ]   Ankle Pumps  10 15            Quad Sets  10  15           Gluteal Sets  10  15           Hip ABd/ADduction  10 15           Straight Leg Raises   15            Knee Slides  10  15           Short Arc Quads   15            Long Arc Quads               Chair Slides   15                            B = bilateral; AA = active assistive; A = active; P = passive       Treatment/Session Assessment:         Response to Treatment:  tolerated well. Education:  [x ] Home Exercises  [X] Fall Precautions  [ ] Hip Precautions [x ] D/C Instruction Review  [x ] Knee/Hip Prosthesis Review  [X] Walker Management/Safety [ ] Adaptive Equipment as Needed         Interdisciplinary Collaboration:   · Physical Therapist, Registered Nurse and Rehabilitation Attendant     After treatment position/precautions:   · Up in chair, Bed/Chair-wheels locked, Call light within reach, RN notified and Family at bedside     Compliance with Program/Exercises: compliant. Recommendations/Intent for next treatment session:  Treatment next visit will focus on increasing Ms. Render's independence with bed mobility, transfers, gait training, strength/ROM exercises, modalities for pain, and patient education.        Total Treatment Duration:  PT Patient Time In/Time Out  Time In: 1315  Time Out: [address], PT

## 2017-09-22 NOTE — PROGRESS NOTES
Problem: Mobility Impaired (Adult and Pediatric)  Goal: *Acute Goals and Plan of Care (Insert Text)  GOALS (1-4 days):  (1.)Ms. Renia will move from supine to sit and sit to supine in bed with STAND BY ASSIST.  (2.)Ms. Reina will transfer from bed to chair and chair to bed with STAND BY ASSIST using the least restrictive device. Goal met 9/22/17. (3.)Ms. Reina will ambulate with STAND BY ASSIST for 200 feet with the least restrictive device. (4.)Ms. Reina will ambulate up/down 3 steps with bilateral railing with CONTACT GUARD ASSIST with no device. (5.)Ms. Diamond Burks will increase right knee ROM to 5°-80°.  ________________________________________________________________________________________________      PHYSICAL THERAPY JOINT CAMP TKA: Daily Note, Treatment Day: 1st and AM 9/22/2017  INPATIENT: Hospital Day: 2  Payor: Maryam Simpson / Plan: [address] [place] [address] HMO / Product Type: My [employer] Direct Care Medicare /      NAME/AGE/GENDER: Eli July is a 68 y.o. female              PRIMARY DIAGNOSIS:  Unilateral primary osteoarthritis, right knee [M17.11]              Procedure(s) and Anesthesia Type:     * RIGHT KNEE ARTHROPLASTY TOTAL/AMBER ANCEF 2gm / Claven Simmer - Spinal (Right)  ICD-10: Treatment Diagnosis:        · Pain in Right Knee (M25.561)  · Stiffness of Right Knee, Not elsewhere classified (M25.661)  · Difficulty in walking, Not elsewhere classified (R26.2)  · Other abnormalities of gait and mobility (R26.89)       ASSESSMENT:      Ms. Diamond Burks presents with decreased strength and ROM R LE and limited functional mobility S/P R TKA. Patient had L TKA in 2014. She demonstrates increased gait distance this morning and required less assist with functional mobility. She is making good progress with goals. This section established at most recent assessment   PROBLEM LIST (Impairments causing functional limitations):  1. Decreased Strength  2. Decreased ADL/Functional Activities  3.  Decreased Transfer Abilities  4. Decreased Ambulation Ability/Technique  5. Decreased Flexibility/Joint Mobility  6. Decreased [place] with Home Exercise Program    INTERVENTIONS PLANNED: (Benefits and precautions of physical therapy have been discussed with the patient.)  1. Bed Mobility  2. Gait Training  3. Home Exercise Program (HEP)  4. Therapeutic Exercise/Strengthening  5. Transfer Training  6. Range of Motion: active/assisted/passive  7. Therapeutic Activities  8. Group Therapy      TREATMENT PLAN: Frequency/Duration: Follow patient BID   to address above goals. Rehabilitation Potential For Stated Goals: GOOD      RECOMMENDED REHABILITATION/EQUIPMENT: (at time of discharge pending progress): Continue Skilled Therapy and Home Health: Physical Therapy. HISTORY:   History of Present Injury/Illness (Reason for Referral):  S/P R TKA  Past Medical History/Comorbidities:   Ms. Bandar Cox  has a past medical history of Acquired keratoderma; Aortic valve sclerosis; Conduction disorder, unspecified; Contusion of chest wall; Herpes zoster without mention of complication; History of third degree heart block; Hypercholesterolemia; Hypertension; Impaired fasting glucose (9/4/2014); Loss of weight; Mixed hyperlipidemia (9/4/2014); Murmur, cardiac; Osteoarthritis; Osteoporosis, post-menopausal; Other malaise and fatigue; Other primary cardiomyopathies (9/4/2014); PAF (paroxysmal atrial fibrillation) ([address] (9/20/2013); Paroxysmal a-fib ([address] Pes anserinus tendinitis or bursitis; PUD (peptic ulcer disease); Sick sinus syndrome ([address] Sick sinus syndrome ([address] (9/20/2013); Third degree heart block ([address] (9/20/2013); Unspecified essential hypertension (9/4/2014); and Unspecified vitamin D deficiency. She also has no past medical history of Aneurysm ([address] Coagulation defects; COPD; Difficult intubation; Heart failure ([address] Malignant hyperthermia due to anesthesia; Morbid obesity ([address] Nausea & vomiting;  Other ill-defined conditions; Pseudocholinesterase deficiency; Psychiatric disorder; Unspecified adverse effect of anesthesia; or Unspecified sleep apnea. Ms. Kandice Rodas  has a past surgical history that includes pacemaker placement (, ); tonsillectomy (); appendectomy ();  section; dilation and curettage (multiple ); total abdominal hysterectomy (); breast lumpectomy (); open cholecystectomy (); hammer toe repair (); cholecystectomy (585); pacemaker; and orthopaedic (Left). Social History/Living Environment:   Home Environment: Private residence  # Steps to Enter: 5  Hand Rails : Right  One/Two Story Residence: Two story, live on 1st floor  # of Interior Steps: 10  Interior Rails: Left  Living Alone: No  Support Systems: Spouse/Significant Other/Partner  Patient Expects to be Discharged to[de-identified] Private residence  Current DME Used/Available at Home: Walker, rolling  Tub or Shower Type: Shower  Prior Level of Function/Work/Activity:  Independent with gait and ADLs. Number of Personal Factors/Comorbidities that affect the Plan of Care: 1-2: MODERATE COMPLEXITY   EXAMINATION:   Most Recent Physical Functioning:                                     Transfers  Sit to Stand: Stand-by asssistance  Stand to Sit: Stand-by asssistance  Bed to Chair: Stand-by asssistance     Balance  Sitting: Intact  Standing: With support                Weight Bearing Status  Right Side Weight Bearing: As tolerated  Distance (ft): 80 Feet (ft)  Ambulation - Level of Assistance: Stand-by asssistance  Assistive Device: Walker, rolling  Speed/Helen: Slow  Stance: Right decreased  Gait Abnormalities: Antalgic;Decreased step clearance  Interventions: Manual cues; Safety awareness training;Verbal cues      Braces/Orthotics: none     Right Knee Cold  Type: Cryocuff       Body Structures Involved:  1. Bones  2. Joints  3. Muscles Body Functions Affected:  1. Neuromusculoskeletal  2.  Movement Related Activities and Participation Affected:  1. General Tasks and Demands  2. Mobility  3. Self Care   Number of elements that affect the Plan of Care: 3: MODERATE COMPLEXITY   CLINICAL PRESENTATION:   Presentation: Stable and uncomplicated: LOW COMPLEXITY   CLINICAL DECISION MAKIN [facility] [address] [address] AM-PAC 6 Clicks   Basic Mobility Inpatient Short Form  How much difficulty does the patient currently have. .. Unable A Lot A Little None   1. Turning over in bed (including adjusting bedclothes, sheets and blankets)? [ ] 1   [ ] 2   [X] 3   [ ] 4   2. Sitting down on and standing up from a chair with arms ( e.g., wheelchair, bedside commode, etc.)   [ ] 1   [ ] 2   [X] 3   [ ] 4   3. Moving from lying on back to sitting on the side of the bed? [ ] 1   [ ] 2   [X] 3   [ ] 4   How much help from another person does the patient currently need. .. Total A Lot A Little None   4. Moving to and from a bed to a chair (including a wheelchair)? [ ] 1   [ ] 2   [X] 3   [ ] 4   5. Need to walk in hospital room? [ ] 1   [ ] 2   [X] 3   [ ] 4   6. Climbing 3-5 steps with a railing? [ ] 1   [X] 2   [ ] 3   [ ] 4   © , Trustees of [address] [place] [address] [address] [address], under license to [employer]. All rights reserved       Score:  Initial: 17 Most Recent: X (Date: -- )     Interpretation of Tool:  Represents activities that are increasingly more difficult (i.e. Bed mobility, Transfers, Gait).        Score 24 23 22-20 19-15 14-10 9-7 6       Modifier CH CI CJ CK CL CM CN         · Mobility - Walking and Moving Around:               - CURRENT STATUS:    CK - 40%-59% impaired, limited or restricted               - GOAL STATUS:           CJ - 20%-39% impaired, limited or restricted               - D/C STATUS:                       ---------------To be determined---------------  Payor: [employer] MEDICARE / Plan: [address] [place] [address] HMO / Product Type: Managed Care Medicare /       Medical Necessity:     · Patient demonstrates good rehab potential due to higher previous functional level. Reason for Services/Other Comments:  · Patient continues to require present interventions due to patient's inability to perform exercises and functional mobility independently. Use of outcome tool(s) and clinical judgement create a POC that gives a: Clear prediction of patient's progress: LOW COMPLEXITY                 TREATMENT:   (In addition to Assessment/Re-Assessment sessions the following treatments were rendered)      Pre-treatment Symptoms/Complaints:  R knee  pain  Pain: Initial:   Pain Intensity 1: 5  Pain Location 1: Knee  Pain Orientation 1: Right  Pain Intervention(s) 1: Cold pack, Nurse notified, Repositioned  Post Session:  5      Gait Training (13 Minutes):  Gait training to improve and/or restore physical functioning as related to dynamic movement of knee - right to improve functional gait pattern. Ambulated 80 Feet (ft) with Stand-by asssistance using a Walker, rolling and minimal Manual cues; Safety awareness training;Verbal cues related to their knee position and motion to promote proper body mechanics. Therapeutic Exercise: (12 Minutes):  Exercises per grid below to improve mobility and strength. Required minimal verbal and manual cues to perform exercises correctly. Progressed range and repetitions as indicated. Date:   9/22/17 Date:    Date:      ACTIVITY/EXERCISE AM PM AM PM AM PM   GROUP THERAPY  [ ]  [ ]  [ ]  [ ]  [ ]  [ ]   Ankle Pumps  10             Quad Sets  10             Gluteal Sets  10             Hip ABd/ADduction  10             Straight Leg Raises               Knee Slides  10             Short Arc Quads               Long Arc Quads               Chair Slides                               B = bilateral; AA = active assistive; A = active; P = passive       Treatment/Session Assessment:         Response to Treatment:  tolerated well.      Education:  [x ] Home Exercises  [X] Fall Precautions  [ ] Hip Precautions [ ] D/C Instruction Review  [ ] Knee/Hip Prosthesis Review  [X] Walker Management/Safety [ ] Adaptive Equipment as Needed         Interdisciplinary Collaboration:   · Physical Therapist and Registered Nurse     After treatment position/precautions:   · Up in chair, Bed/Chair-wheels locked, Call light within reach, RN notified and Family at bedside     Compliance with Program/Exercises: compliant. Recommendations/Intent for next treatment session:  Treatment next visit will focus on increasing Ms. Render's independence with bed mobility, transfers, gait training, strength/ROM exercises, modalities for pain, and patient education.        Total Treatment Duration:  PT Patient Time In/Time Out  Time In: 0945  Time Out: [address], PT

## 2017-09-22 NOTE — PROGRESS NOTES
[facility] - INPATIENT  Face to Face Encounter    Patients Name: Lawyer Serrano    YOB: 1940    Ordering Physician: Brayan Boyd    Primary Diagnosis: Unilateral primary osteoarthritis, right knee [M17.11]  S/p right TKA    Date of Face to Face:   9-21-17                                Face to Face Encounter findings are related to primary reason for home care:   yes. 1. I certify that the patient needs intermittent care as follows: physical therapy: gait/stair training    2. I certify that this patient is homebound, that is: 1) patient requires the use of a walker device, special transportation, or assistance of another to leave the home; or 2) patient's condition makes leaving the home medically contraindicated; and 3) patient has a normal inability to leave the home and leaving the home requires considerable and taxing effort. Patient may leave the home for infrequent and short duration for medical reasons, and occasional absences for non-medical reasons. Homebound status is due to the following functional limitations: Patient's ambulation limited secondary to severe pain and requires the use of an assistive device and the assistance of a caregiver for safe completion. Patient with strength and ROM deficits limiting ambulation endurance requiring the use of an assistive device and the assistance of a caregiver. Patient deemed temporarily homebound secondary to increased risk for infection when leaving home and going out into the community. 3. I certify that this patient is under my care and that I, or a nurse practitioner or  [id number], or clinical nurse specialist, or certified nurse midwife, working with me, had a Face-to-Face Encounter that meets the physician Face-to-Face Encounter requirements.   The following are the clinical findings from the [address] [place] [address] encounter that support the need for skilled services and is a summary of the encounter: see hospital chart        Quinton Pavel Cynthia Tolentino, [address]  9/22/2017      THE FOLLOWING TO BE COMPLETED BY THE COMMUNITY PHYSICIAN:    I concur with the findings described above from the F2F encounter that this patient is homebound and in need of a skilled service.     Certifying Physician: _____________________________________      Printed Certifying Physician Name: _____________________________________    Date: _________________

## 2017-09-24 LAB
BACTERIA SPEC CULT: ABNORMAL
SERVICE CMNT-IMP: ABNORMAL

## 2022-03-19 PROBLEM — M17.11 ARTHRITIS OF KNEE, RIGHT: Status: ACTIVE | Noted: 2017-09-21

## 2022-06-09 NOTE — PERIOP NOTES
Last cardiology office visit (from 3/3/17), EKG dated 3/21/16, Echo- LVEF 50-55% (report from 3/11/2016) and copy of last pacemaker interrogation (12/27/16) all placed on chart for anesthesia reference. The patient is on Entresto so therefore she should not be on lisinopril. Please make sure that the pharmacist knows that she they are not to give lisinopril and Entresto.

## 2023-12-06 ENCOUNTER — OUTSIDE SERVICES (OUTPATIENT)
Dept: FAMILY MEDICINE CLINIC | Age: 83
End: 2023-12-06

## 2023-12-06 DIAGNOSIS — I48.91 ATRIAL FIBRILLATION, UNSPECIFIED TYPE (HCC): ICD-10-CM

## 2023-12-06 DIAGNOSIS — E78.2 MIXED HYPERLIPIDEMIA: ICD-10-CM
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
## 2023-12-06 DIAGNOSIS — I10 PRIMARY HYPERTENSION: ICD-10-CM

## 2023-12-06 DIAGNOSIS — H35.30 MACULAR DEGENERATION OF BOTH EYES, UNSPECIFIED TYPE: ICD-10-CM

## 2023-12-07 NOTE — PROGRESS NOTES
shortness of breath, nausea or vomiting, constipation or diarrhea, fever or chills, falls or syncopal events. She states that she has been seen in conjunction with cardiology with history of her first heart problem at the age of 77 which resulted in a heart block with which she did have pacemaker placement. Her first pacemaker lasted 8 years before requiring a second pacemaker placement and she is now with a dual pacer with pacer dependence. She is now seen in conjunction with Dr. Óscar Bob as with the department of cardiology. Medications:  Amlodipine 2.5 mg daily  B complex daily on Tuesday and Friday  Biotin 5002 daily  Bisacodyl suppository 10 mg daily as needed  Carvedilol 12.5 mg twice daily  Cholecalciferol 25 mcg daily  Coumadin 6 mg M-W-F-Sunday  Coumadin 6.5 mg at bedtime Tuesday, Thursday, Saturday  Cyanocobalamin 500 mcg daily  Fish oil 2000 mg daily  Hyzaar 100-12.5 mg daily  Lipitor 20 mg daily  Loratadine 10 mg daily as needed  Lubricant eyedrops twice daily  Milk of magnesia daily as needed  Oyster shell calcium with vitamin D 500-200 mg daily  PreserVision AREDS 2 twice daily    Objective     Vital Signs: Vital signs stable  Chart    Physical examination:Skin is essentially warm and dry. HEENT unremarkable. Neck is supple. Heart regular rate and rhythm. No rubs, gallops or murmurs noted. Lungs are clear to auscultation. No evidence of rhonchi, rales, or wheezing. Abdomen is soft, supple and non-tender. Bowel sounds are noted x4 quadrants. No rigidity, guarding or rebound tenderness. Negative Amador's, negative McBurney's, negative Henry's. Extremities; no true pitting edema. Pulses are adequate. No clubbing  or no cyanosis noted. Walking boot intact to the left lower extremity with compression sleeve to the right lower extremity. She does self propel with respect to a wheelchair. Neurologically she  is alert and oriented x3.   No evidence of paralysis or paresthesias

## 2024-01-04 ENCOUNTER — OUTSIDE SERVICES (OUTPATIENT)
Dept: FAMILY MEDICINE CLINIC | Age: 84
End: 2024-01-04

## 2024-01-04 DIAGNOSIS — R26.9 ABNORMALITY OF GAIT AND MOBILITY: ICD-10-CM

## 2024-01-04 DIAGNOSIS — I48.91 ATRIAL FIBRILLATION, UNSPECIFIED TYPE (HCC): Primary | ICD-10-CM

## 2024-01-04 DIAGNOSIS — Z95.0 PACEMAKER: ICD-10-CM

## 2024-01-04 DIAGNOSIS — I10 PRIMARY HYPERTENSION: ICD-10-CM
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
## 2024-01-04 DIAGNOSIS — Z20.822 CLOSE EXPOSURE TO COVID-19 VIRUS: ICD-10-CM

## 2024-01-04 NOTE — PROGRESS NOTES
2024    Mel MORAES Render  1940    This resident is being seen today for a follow-up evaluation visit.    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  She is a 83 y.o. female resident who is being seen today for a follow-up visit with this resident doing relatively well at this time but there was concerns regarding her CODE STATUS which was listed as a full code.  Her son did indicate that she is most definitely a comfort care and he did want this to be addressed.  I did therefore make recommendations to change her CODE STATUS to a DNR CCA with no heroics.  She had no concerns regarding her own status but was more concerned about her 's medical decline.  She denies current pain, headaches or dizziness, sore throat, chest pain, coughing or shortness of breath, nausea or vomiting, constipation or diarrhea, fever or chills, falls or syncopal events.        Medications:  Amlodipine 2.5 mg daily  B complex daily on Tuesday and Friday  Biotin 5002 daily  Bisacodyl suppository 10 mg daily as needed  Carvedilol 12.5 mg twice daily  Cholecalciferol 25 mcg daily  Coumadin 6 mg   Coumadin 6.5 mg at bedtime Tuesday, Thursday, Saturday  Cyanocobalamin 500 mcg daily  Fish oil 2000 mg daily  Hyzaar

## 2024-01-17 ENCOUNTER — OUTSIDE SERVICES (OUTPATIENT)
Dept: FAMILY MEDICINE CLINIC | Age: 84
End: 2024-01-17

## 2024-01-17 DIAGNOSIS — S22.49XS CLOSED FRACTURE OF MULTIPLE RIBS, UNSPECIFIED LATERALITY, SEQUELA: ICD-10-CM

## 2024-01-17 DIAGNOSIS — I10 PRIMARY HYPERTENSION: ICD-10-CM

## 2024-01-17 DIAGNOSIS — E78.2 MIXED HYPERLIPIDEMIA: ICD-10-CM

## 2024-01-17 DIAGNOSIS — Z63.4 SPOUSE DECEASED: Primary | ICD-10-CM

## 2024-01-17 DIAGNOSIS — I48.91 ATRIAL FIBRILLATION, UNSPECIFIED TYPE (HCC): ICD-10-CM

## 2024-01-17 SDOH — SOCIAL STABILITY - SOCIAL INSECURITY: DISSAPEARANCE AND DEATH OF FAMILY MEMBER: Z63.4

## 2024-01-17 NOTE — PROGRESS NOTES
regard and continue with her current medical regimen and I will see her routinely and as needed with further orders forthcoming.      RENA ZULETA, APRN - CNP      *Note was creating using voice recognition software.  The document was reviewed however grammatical errors may exist.

## 2024-02-09 ENCOUNTER — OUTSIDE SERVICES (OUTPATIENT)
Dept: FAMILY MEDICINE CLINIC | Age: 84
End: 2024-02-09

## 2024-02-09 DIAGNOSIS — E53.8 B12 DEFICIENCY: ICD-10-CM

## 2024-02-09 DIAGNOSIS — D68.9 COAGULOPATHY (HCC): ICD-10-CM

## 2024-02-09 DIAGNOSIS — E55.9 VITAMIN D DEFICIENCY: Primary | ICD-10-CM

## 2024-02-09 DIAGNOSIS — I73.9 PERIPHERAL VASCULAR DISEASE (HCC): ICD-10-CM

## 2024-02-09 DIAGNOSIS — I10 PRIMARY HYPERTENSION: ICD-10-CM

## 2024-02-09 NOTE — PROGRESS NOTES
2024    Mel MORAES Render  1940    This resident is being seen today for a follow-up evaluation visit.    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  She is a 83 y.o. female resident who is being seen today for a follow-up visit with which this resident states that she has been doing well but she still has concerns about her insurance coverage.  She is currently under [employer] insurance and states that she has been unable to find Dr. Dumont under the services and she is concerned.  I did bring this to the attention of my  who is going to call and speak with her.  Medically, she states that she feels well and that she has been dealing with the loss of her  and realizes that it is a much better place for him now that he is out of pain/discomfort.  She offers no complaints with respect to any pain, headaches or dizziness, sore throat, chest pain, coughing or shortness of breath, nausea or vomiting, constipation or diarrhea, fever or chills, falls or syncopal events.        Medications:  Amlodipine 2.5 mg daily  B complex daily on Tuesday and Friday  Biotin 5002 daily  Bisacodyl suppository 10 mg daily as needed  Carvedilol 12.5 mg twice daily  Cholecalciferol 25 mcg daily  Coumadin 6 mg

## 2024-04-10 ENCOUNTER — OUTSIDE SERVICES (OUTPATIENT)
Dept: FAMILY MEDICINE CLINIC | Age: 84
End: 2024-04-10

## 2024-04-10 DIAGNOSIS — H35.30 MACULAR DEGENERATION OF BOTH EYES, UNSPECIFIED TYPE: ICD-10-CM

## 2024-04-10 DIAGNOSIS — E78.2 MIXED HYPERLIPIDEMIA: ICD-10-CM

## 2024-04-10 DIAGNOSIS — E55.9 VITAMIN D DEFICIENCY: ICD-10-CM

## 2024-04-10 DIAGNOSIS — I48.91 ATRIAL FIBRILLATION, UNSPECIFIED TYPE (HCC): Primary | ICD-10-CM

## 2024-04-10 DIAGNOSIS — I10 PRIMARY HYPERTENSION: ICD-10-CM

## 2024-05-02 ENCOUNTER — OUTSIDE SERVICES (OUTPATIENT)
Dept: FAMILY MEDICINE CLINIC | Age: 84
End: 2024-05-02
Payer: MEDICARE

## 2024-05-02 DIAGNOSIS — E78.2 MIXED HYPERLIPIDEMIA: ICD-10-CM

## 2024-05-02 DIAGNOSIS — D68.9 COAGULOPATHY (HCC): ICD-10-CM

## 2024-05-02 DIAGNOSIS — I73.9 PERIPHERAL VASCULAR DISEASE (HCC): ICD-10-CM

## 2024-05-02 DIAGNOSIS — E55.9 VITAMIN D DEFICIENCY: ICD-10-CM

## 2024-05-02 DIAGNOSIS — M25.562 ACUTE PAIN OF LEFT KNEE: Primary | ICD-10-CM

## 2024-05-02 DIAGNOSIS — E53.8 B12 DEFICIENCY: ICD-10-CM

## 2024-05-02 PROCEDURE — 99349 HOME/RES VST EST MOD MDM 40: CPT | Performed by: NURSE PRACTITIONER

## 2024-05-03 NOTE — PROGRESS NOTES
daily  Vitamin D 3000 units daily          Objective     Vital Signs: /69 pulse 73 respirations 18 temperature 96.7 O2 96% weight 174.6 pounds        Physical examination:Skin is essentially warm and dry. HEENT unremarkable. Neck is supple. Heart regular rate and rhythm. No rubs, gallops or murmurs noted.  Lungs are clear to auscultation.  No evidence of rhonchi, rales, or wheezing. Abdomen is soft, supple and non-tender.  Bowel sounds are noted x4 quadrants. No rigidity, guarding or rebound tenderness.  Negative Amador's, negative McBurney's, negative Henry's.  Extremities; no true pitting edema.  Pulses are adequate. No clubbing  or no cyanosis noted.  Walking boot intact to the left lower extremity with compression sleeve to the right lower extremity.  She does self propel with respect to a wheelchair.  Neurologically she  is alert and oriented x3.  No evidence of paralysis or paresthesias noted.    Diagnoses and all orders for this visit:    Acute pain of left knee  Comments:  Monitor for lower extremity pain and for 3 days and continue with Tylenol as needed for pain management    Peripheral vascular disease (HCC)  Comments:  Continue with lipid and blood pressure management    Coagulopathy (HCC)  Comments:  Maintain chronic Coumadin therapy    B12 deficiency  Comments:  Controlled with B12 supplementation    Mixed hyperlipidemia  Comments:  Currently controlled with fish oil    Vitamin D deficiency  Comments:  Status post upward titration of vitamin D supplementation                    Plan:  Plan of care was discussed with the healthcare team with medications and labs reviewed.  H/H 12/36.8 BUNs/creatinine 18/0.7 with a GFR of 86 lipid panel stable vitamin D of 31.3 previously 58.5 with a B12 of 607.  She did have upward titration of the vitamin D at that time with recommendations to reevaluate the vitamin D level in 3 months.  Overall she has been stable but there is the concern regarding the left knee

## 2024-05-30 RX ORDER — WARFARIN SODIUM 6 MG/1
6.5 TABLET ORAL DAILY
Qty: 72 TABLET | Refills: 1 | Status: SHIPPED | OUTPATIENT
Start: 2024-05-30

## 2024-05-30 RX ORDER — WARFARIN SODIUM 6 MG/1
6 TABLET ORAL DAILY
Qty: 48 TABLET | Refills: 1 | Status: SHIPPED | OUTPATIENT
Start: 2024-05-30

## 2024-05-30 NOTE — TELEPHONE ENCOUNTER
[facility] pharmacy asking for a 90 days supply of Mel Warfarin.     Last saw Steph  5/2/24.    We do not show Mel taking warfarin on her med list. So I looked in the last visit encounter   I pended the med from that encounter    STEPH PLEASE REVIEW AND CHECK INSTRUCTIONS THAT ARE PENDED.

## 2024-06-06 ENCOUNTER — TELEPHONE (OUTPATIENT)
Dept: FAMILY MEDICINE CLINIC | Age: 84
End: 2024-06-06

## 2024-06-06 NOTE — TELEPHONE ENCOUNTER
Clarification request was sent to the office.  2 RX's for 6 mg was sent on 5/30, but the instructions on the second one says 6.5 mg on Tuesday, Thursday, and Saturday.  They are asking how the patient is to get the 0.5 mg to make the 6.5 mg for those days.  Was a different dose to be sent or different instructions?  Please advise and place order for new RX to go to [facility].

## 2024-06-11 RX ORDER — CARVEDILOL 12.5 MG/1
12.5 TABLET ORAL 2 TIMES DAILY
Qty: 180 TABLET | Refills: 1 | Status: SHIPPED | OUTPATIENT
Start: 2024-06-11

## 2024-06-11 RX ORDER — MELATONIN
1 2 TIMES DAILY
Qty: 180 TABLET | Refills: 1 | Status: SHIPPED | OUTPATIENT
Start: 2024-06-11

## 2024-06-11 RX ORDER — ANTIOX #8/OM3/DHA/EPA/LUT/ZEAX 250-2.5 MG
1 CAPSULE ORAL 2 TIMES DAILY
Qty: 180 CAPSULE | Refills: 1 | Status: SHIPPED | OUTPATIENT
Start: 2024-06-11

## 2024-06-12 NOTE — PROGRESS NOTES
4/10/2024        Mel MORAES Render  1940    This resident is being seen today for a follow-up evaluation visit.    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  She is a 83 y.o. female resident who is being seen today for a follow-up evaluation with this resident stating that she feels that she is doing well and has been tolerating her medications in a satisfactory manner.  She is able to self-propel with respect to a wheelchair.  She offers no complaints regarding pain, headaches or dizziness, sore throat, chest pain, coughing or shortness of breath, nausea or vomiting, constipation or diarrhea, fever or chills, falls or syncopal events.        Medications:  Amlodipine 2.5 mg daily  B complex daily on Tuesday and Friday  Biotin 5002 daily  Bisacodyl suppository 10 mg daily as needed  Carvedilol 12.5 mg twice daily  Cholecalciferol 25 mcg daily  Coumadin 6 mg   Coumadin 6.5 mg at bedtime Tuesday, Thursday, Saturday  Cyanocobalamin 500 mcg daily  Fish oil 2000 mg daily  Hyzaar 100-12.5 mg daily  Lipitor 20 mg daily  Loratadine 10 mg daily as needed  Lubricant eyedrops twice daily  Milk of magnesia daily as needed  Oyster shell calcium with vitamin D 500-200 mg daily  PreserVision AREDS 2 twice  No

## 2024-06-12 NOTE — TELEPHONE ENCOUNTER
Spoke with Anne Marie at [facility].  She stated that it has been cleared up with the pharmacy and will call the office if there is an issue.

## 2024-06-19 RX ORDER — WARFARIN SODIUM 6 MG/1
6 TABLET ORAL DAILY
Qty: 48 TABLET | Refills: 1 | Status: SHIPPED | OUTPATIENT
Start: 2024-06-19

## 2024-06-19 NOTE — TELEPHONE ENCOUNTER
Refill     The fax didn't state the days they should take it so please make sure that's still what you want

## 2024-06-20 ENCOUNTER — OUTSIDE SERVICES (OUTPATIENT)
Dept: FAMILY MEDICINE CLINIC | Age: 84
End: 2024-06-20

## 2024-06-20 DIAGNOSIS — I10 PRIMARY HYPERTENSION: ICD-10-CM

## 2024-06-20 DIAGNOSIS — L98.9 SKIN LESION: Primary | ICD-10-CM

## 2024-06-20 DIAGNOSIS — E55.9 VITAMIN D DEFICIENCY: ICD-10-CM

## 2024-06-20 DIAGNOSIS — H35.30 MACULAR DEGENERATION OF BOTH EYES, UNSPECIFIED TYPE: ICD-10-CM

## 2024-06-20 DIAGNOSIS — I48.91 ATRIAL FIBRILLATION, UNSPECIFIED TYPE (HCC): ICD-10-CM

## 2024-06-20 NOTE — PROGRESS NOTES
daily  Fish oil 2000 mg daily  Hyzaar 100-12.5 mg daily  Lipitor 20 mg daily  Loratadine 10 mg daily as needed  Lubricant eyedrops twice daily  Milk of magnesia daily as needed  Oyster shell calcium with vitamin D 500-200 mg daily  PreserVision AREDS 2 twice daily  Vitamin D 3000 units daily          Objective     Vital Signs: /66 pulse 88 respirations 16 temperature 97.2 O2 96% weight 175 pounds        Physical examination:Skin is essentially warm and dry.  She does have an area noted to the lower back which is raised with scabbing and bleeding.  HEENT unremarkable. Neck is supple. Heart regular rate and rhythm. No rubs, gallops or murmurs noted.  Lungs are clear to auscultation.  No evidence of rhonchi, rales, or wheezing. Abdomen is soft, supple and non-tender.  Bowel sounds are noted x4 quadrants. No rigidity, guarding or rebound tenderness.  Negative Amador's, negative McBurney's, negative Henry's.  Extremities; no true pitting edema.  Pulses are adequate. No clubbing  or no cyanosis noted.  Walking boot intact to the left lower extremity with compression sleeve to the right lower extremity.  She does self propel with respect to a wheelchair.  Neurologically she  is alert and oriented x3.  No evidence of paralysis or paresthesias noted.    Diagnoses and all orders for this visit:    Skin lesion  Comments:  Send to department of dermatology for evaluation and treatment    Macular degeneration of both eyes, unspecified type  Comments:  Status post injections per department of ophthalmology    Primary hypertension  Comments:  Maintain carvedilol with Hyzaar and amlodipine    Atrial fibrillation, unspecified type (HCC)  Comments:  Maintain chronic Coumadin therapy with carvedilol    Vitamin D deficiency  Comments:  Controlled with recent upward titration of vitamin D supplementation                      Plan:  Plan of care was discussed with the healthcare team with medications and labs reviewed.  INR 2.4 H/H

## 2024-07-18 ENCOUNTER — OUTSIDE SERVICES (OUTPATIENT)
Dept: FAMILY MEDICINE CLINIC | Age: 84
End: 2024-07-18

## 2024-07-18 DIAGNOSIS — Z00.8 ENCOUNTER FOR OTHER GENERAL EXAMINATION: Primary | ICD-10-CM

## 2024-07-26 VITALS
WEIGHT: 175 LBS | DIASTOLIC BLOOD PRESSURE: 50 MMHG | OXYGEN SATURATION: 94 % | BODY MASS INDEX: 33.04 KG/M2 | HEART RATE: 88 BPM | TEMPERATURE: 96.7 F | SYSTOLIC BLOOD PRESSURE: 118 MMHG | HEIGHT: 61 IN

## 2024-07-26 NOTE — PROGRESS NOTES
Medicare Annual Wellness Visit    Mel Ballesteros is here for Medicare AWV    Assessment & Plan   Encounter for other general examination  Recommendations for Preventive Services Due: see orders and patient instructions/AVS.  Recommended screening schedule for the next 5-10 years is provided to the patient in written form: see Patient Instructions/AVS.     Return in 1 year (on 7/18/2025) for Medicare Annual Wellness Visit in 1 year.     Subjective       Patient's complete Health Risk Assessment and screening values have been reviewed and are found in Flowsheets. The following problems were reviewed today and where indicated follow up appointments were made and/or referrals ordered.    Positive Risk Factor Screenings with Interventions:    Fall Risk:  Do you feel unsteady or are you worried about falling? : (!) yes  2 or more falls in past year?: no  Fall with injury in past year?: no     Interventions:    Reviewed medications, home hazards, visual acuity, and co-morbidities that can increase risk for falls    Cognitive:   Clock Drawing Test (CDT): (!) Abnormal  Words recalled: 2 Words Recalled  Total Score: (!) 2  Total Score Interpretation: Abnormal Mini-Cog  Interventions:  Patient advised to follow-up in this office for further evaluation and treatment            Inactivity:  On average, how many days per week do you engage in moderate to strenuous exercise (like a brisk walk)?: 2 days (!) Abnormal  On average, how many minutes do you engage in exercise at this level?: 10 min  Interventions:  Patient declined any further interventions or treatment     Abnormal BMI (obese):  Body mass index is 33.07 kg/m². (!) Abnormal  Interventions:  Patient declines any further evaluation or treatment            ADL's:   Patient reports needing help with:  Select all that apply: (!) Laundry, Housekeeping, Taking Medications, Food Preparation  Interventions:  Patient comments: Patient gets assistance at the facility

## 2024-07-26 NOTE — PATIENT INSTRUCTIONS
problems you have, there is a form of activity that will work for you. And the more physical activity you can do, the better your overall health will be.  What kinds of activity can help you stay healthy?  Being more active will make your daily activities easier. Physical activity includes planned exercise and things you do in daily life. There are four types of activity:  Aerobic.  Doing aerobic activity makes your heart and lungs strong.  Includes walking, dancing, and gardening.  Aim for at least 2½ hours spread throughout the week.  It improves your energy and can help you sleep better.  Muscle-strengthening.  This type of activity can help maintain muscle and strengthen bones.  Includes climbing stairs, using resistance bands, and lifting or carrying heavy loads.  Aim for at least twice a week.  It can help protect the knees and other joints.  Stretching.  Stretching gives you better range of motion in joints and muscles.  Includes upper arm stretches, calf stretches, and gentle yoga.  Aim for at least twice a week, preferably after your muscles are warmed up from other activities.  It can help you function better in daily life.  Balancing.  This helps you stay coordinated and have good posture.  Includes heel-to-toe walking, alvin chi, and certain types of yoga.  Aim for at least 3 days a week.  It can reduce your risk of falling.  Even if you have a hard time meeting the recommendations, it's better to be more active than less active. All activity done in each category counts toward your weekly total. You'd be surprised how daily things like carrying groceries, keeping up with grandchildren, and taking the stairs can add up.  What keeps you from being active?  If you've had a hard time being more active, you're not alone. Maybe you remember being able to do more. Or maybe you've never thought of yourself as being active. It's frustrating when you can't do the things you want. Being more active can help. What's

## 2024-08-15 ENCOUNTER — OUTSIDE SERVICES (OUTPATIENT)
Dept: FAMILY MEDICINE CLINIC | Age: 84
End: 2024-08-15

## 2024-08-15 DIAGNOSIS — D68.9 COAGULOPATHY (HCC): Primary | ICD-10-CM

## 2024-08-15 DIAGNOSIS — C44.90 SKIN CANCER: ICD-10-CM

## 2024-08-15 DIAGNOSIS — E53.8 B12 DEFICIENCY: ICD-10-CM

## 2024-08-15 DIAGNOSIS — E78.2 MIXED HYPERLIPIDEMIA: ICD-10-CM

## 2024-08-15 DIAGNOSIS — I73.9 PERIPHERAL VASCULAR DISEASE (HCC): ICD-10-CM

## 2024-08-15 NOTE — PROGRESS NOTES
8/15/2024      Mel MORAES Render  1940    This resident is being seen today for a follow-up evaluation visit.  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  She is a 84 y.o. female resident who is being seen today for a follow-up visit with which she was recently seen in conjunction with the department of dermatology due to an area of concern to the lower back which was confirmed to be cancerous.  She states that she does have a follow-up appointment pending with [facility] dermatology.  Furthermore, she states that she has had some postnasal drip and that she ordered over-the-counter Coricidin and she did just start taking it last night.  She also states that she is scheduled to be seen in conjunction with the department of cardiology with a Dr. Lula Mascorro in October.  She has no complaints at this time regarding any pain, headaches or dizziness, sore throat, chest pain, nausea or vomiting, constipation or diarrhea, fever or chills, syncopal events or seizure activity.      Medications:  Amlodipine 2.5 mg daily  Atorvastatin 20 mg at bedtime  Calcium with vitamin D 500-200 mg daily  Fish oil 2000 mg daily  Losartan/hydrochlorothiazide 100-12.5 mg daily  PreserVision AREDS two 1 capsule twice

## 2024-09-19 ENCOUNTER — OUTSIDE SERVICES (OUTPATIENT)
Dept: FAMILY MEDICINE CLINIC | Age: 84
End: 2024-09-19

## 2024-09-19 DIAGNOSIS — I48.91 ATRIAL FIBRILLATION, UNSPECIFIED TYPE (HCC): ICD-10-CM

## 2024-09-19 DIAGNOSIS — D68.9 COAGULOPATHY (HCC): ICD-10-CM

## 2024-09-19 DIAGNOSIS — I10 PRIMARY HYPERTENSION: ICD-10-CM

## 2024-09-19 DIAGNOSIS — E55.9 VITAMIN D DEFICIENCY: ICD-10-CM

## 2024-09-19 DIAGNOSIS — C44.90 SKIN CANCER: Primary | ICD-10-CM

## 2024-10-11 ENCOUNTER — OUTSIDE SERVICES (OUTPATIENT)
Dept: PRIMARY CARE CLINIC | Age: 84
End: 2024-10-11
Payer: MEDICARE

## 2024-10-11 DIAGNOSIS — I48.0 PAROXYSMAL ATRIAL FIBRILLATION (HCC): ICD-10-CM

## 2024-10-11 DIAGNOSIS — M15.3 POST-TRAUMATIC OSTEOARTHRITIS OF MULTIPLE JOINTS: ICD-10-CM

## 2024-10-11 DIAGNOSIS — I10 PRIMARY HYPERTENSION: Primary | ICD-10-CM

## 2024-10-11 DIAGNOSIS — E78.2 MIXED HYPERLIPIDEMIA: ICD-10-CM

## 2024-10-11 DIAGNOSIS — E55.9 VITAMIN D DEFICIENCY: ICD-10-CM

## 2024-10-11 PROCEDURE — 99349 HOME/RES VST EST MOD MDM 40: CPT | Performed by: NURSE PRACTITIONER

## 2024-10-11 NOTE — PROGRESS NOTES
10/10/2024      Mel MORAES Render  1940    This resident is being seen today for a follow-up evaluation visit.  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  She is a 84 y.o. female resident who is being seen today for a follow-up visit with which this resident has been under assessment for an area to her lower back which was consistent with basal cell carcinoma but is improving well with dermatological intervention.  She did indicate to me that she was recently exposed to COVID and they did test her but it was negative and she denies any symptoms.  She has no complaints regarding any pain, headaches or dizziness, sore throat, chest pain, coughing or shortness of breath, nausea or vomiting, constipation or diarrhea, fever or chills, falls or syncopal events.      Medications:  Amlodipine 2.5 mg daily  Atorvastatin 20 mg at bedtime  Calcium with vitamin D 500-200 mg daily  Fish oil 2000 mg daily  Losartan/hydrochlorothiazide 100-12.5 mg daily  PreserVision AREDS two 1 capsule twice daily  Systane gel 0.4-0.3% 1 drop to both eyes twice daily  Vitamin B complex Tuesday and Friday  Vitamin D3 3000 units daily  Warfarin 6 mg at bedtime  Acetaminophen as needed         Objective     Vital Signs: /98 pulse 74

## 2024-10-15 RX ORDER — LOSARTAN POTASSIUM AND HYDROCHLOROTHIAZIDE 12.5; 1 MG/1; MG/1
1 TABLET ORAL DAILY
COMMUNITY
End: 2024-10-15 | Stop reason: SDUPTHER

## 2024-10-18 RX ORDER — LOSARTAN POTASSIUM AND HYDROCHLOROTHIAZIDE 12.5; 1 MG/1; MG/1
1 TABLET ORAL DAILY
Qty: 30 TABLET | Refills: 5 | Status: SHIPPED | OUTPATIENT
Start: 2024-10-18

## 2024-11-11 RX ORDER — ATORVASTATIN CALCIUM 20 MG/1
20 TABLET, FILM COATED ORAL DAILY
COMMUNITY
End: 2024-11-11 | Stop reason: SDUPTHER

## 2024-11-11 RX ORDER — AMLODIPINE BESYLATE 2.5 MG/1
2.5 TABLET ORAL DAILY
COMMUNITY

## 2024-11-11 RX ORDER — CHOLECALCIFEROL (VITAMIN D3) 25 MCG
1 TABLET ORAL 2 TIMES DAILY
Qty: 180 TABLET | Refills: 1 | Status: CANCELLED | OUTPATIENT
Start: 2024-11-11

## 2024-11-11 RX ORDER — AMLODIPINE BESYLATE 2.5 MG/1
2.5 TABLET ORAL DAILY
Qty: 30 TABLET | Refills: 1 | Status: CANCELLED | OUTPATIENT
Start: 2024-11-11

## 2024-11-11 NOTE — TELEPHONE ENCOUNTER
Name of Medication(s) Requested:  Requested Prescriptions     Pending Prescriptions Disp Refills    carvedilol (COREG) 12.5 MG tablet 180 tablet 1     Sig: Take 1 tablet by mouth 2 times daily    atorvastatin (LIPITOR) 20 MG tablet 90 tablet 1     Sig: Take 1 tablet by mouth daily       Medication is on current medication list Yes    Dosage and directions were verified? Yes    Quantity verified: 90 day supply     Pharmacy Verified?  Yes    Last Appointment:  Visit date not found    Future appts:  No future appointments.     (If no appt send self scheduling link. .REFILLAPPT)  Scheduling request sent?     [] Yes  [x] No    Does patient need updated?  [] Yes  [x] No

## 2024-11-11 NOTE — TELEPHONE ENCOUNTER
Name of Medication(s) Requested:  Requested Prescriptions     Pending Prescriptions Disp Refills    vitamin D3 (CHOLECALCIFEROL) 25 MCG (1000 UT) TABS tablet 180 tablet 1     Sig: Take 1 tablet by mouth 2 times daily       Medication is on current medication list Yes    Dosage and directions were verified? Yes    Quantity verified: 90 day supply     Pharmacy Verified?  Yes    Last Appointment:  Visit date not found    Future appts:  No future appointments.     (If no appt send self scheduling link. .REFILLAPPT)  Scheduling request sent?     [] Yes  [x] No    Does patient need updated?  [] Yes  [x] No

## 2024-11-13 RX ORDER — CARVEDILOL 12.5 MG/1
12.5 TABLET ORAL 2 TIMES DAILY
Qty: 180 TABLET | Refills: 1 | Status: SHIPPED | OUTPATIENT
Start: 2024-11-13

## 2024-11-13 RX ORDER — ATORVASTATIN CALCIUM 20 MG/1
20 TABLET, FILM COATED ORAL DAILY
Qty: 90 TABLET | Refills: 1 | Status: SHIPPED | OUTPATIENT
Start: 2024-11-13

## 2024-11-22 RX ORDER — AMLODIPINE BESYLATE 2.5 MG/1
2.5 TABLET ORAL DAILY
Qty: 30 TABLET | Refills: 2 | Status: CANCELLED | OUTPATIENT
Start: 2024-11-22

## 2024-11-27 NOTE — TELEPHONE ENCOUNTER
Name of Medication(s) Requested:  Requested Prescriptions     Pending Prescriptions Disp Refills    amLODIPine (NORVASC) 2.5 MG tablet 30 tablet 2     Sig: Take 1 tablet by mouth daily       Medication is on current medication list Yes    Dosage and directions were verified? Yes    Quantity verified: 30 day supply     Pharmacy Verified?  Yes    Last Appointment:  Visit date not found    Future appts:  No future appointments.     (If no appt send self scheduling link. .REFILLAPPT)  Scheduling request sent?     [] Yes  [x] No    Does patient need updated?  [] Yes  [] No

## 2024-11-30 RX ORDER — CARVEDILOL 12.5 MG/1
12.5 TABLET ORAL 2 TIMES DAILY
Qty: 180 TABLET | Refills: 1 | Status: SHIPPED | OUTPATIENT
Start: 2024-11-30

## 2024-12-02 RX ORDER — AMLODIPINE BESYLATE 2.5 MG/1
2.5 TABLET ORAL DAILY
Qty: 30 TABLET | Refills: 2 | Status: SHIPPED | OUTPATIENT
Start: 2024-12-02

## 2024-12-11 DIAGNOSIS — I44.2 THIRD DEGREE HEART BLOCK (HCC): Primary | ICD-10-CM

## 2024-12-16 RX ORDER — CHOLECALCIFEROL (VITAMIN D3) 25 MCG
1 TABLET ORAL 2 TIMES DAILY
Qty: 180 TABLET | Refills: 3 | Status: SHIPPED | OUTPATIENT
Start: 2024-12-16

## 2024-12-16 RX ORDER — WARFARIN SODIUM 6 MG/1
TABLET ORAL
Qty: 48 TABLET | Refills: 3 | Status: SHIPPED | OUTPATIENT
Start: 2024-12-16

## 2024-12-16 NOTE — TELEPHONE ENCOUNTER
Name of Medication(s) Requested:  Requested Prescriptions     Pending Prescriptions Disp Refills    vitamin D3 (CHOLECALCIFEROL) 25 MCG (1000 UT) TABS tablet [Pharmacy Med Name: Vitamin D3 Oral Tablet 25 MCG (1000 UT)] 180 tablet 3     Sig: TAKE 1 TABLET TWICE DAILY       Medication is on current medication list Yes    Dosage and directions were verified? Yes    Quantity verified: 90 day supply     Pharmacy Verified?  Yes    Last Appointment:  Visit date not found    Future appts:  No future appointments.     (If no appt send self scheduling link. .REFILLAPPT)  Scheduling request sent?     [] Yes  [x] No    Does patient need updated?  [] Yes  [x] No

## 2025-01-22 RX ORDER — LOSARTAN POTASSIUM AND HYDROCHLOROTHIAZIDE 12.5; 1 MG/1; MG/1
1 TABLET ORAL DAILY
Qty: 90 TABLET | Refills: 3 | OUTPATIENT
Start: 2025-01-22

## 2025-01-22 NOTE — TELEPHONE ENCOUNTER
Name of Medication(s) Requested:  Requested Prescriptions     Pending Prescriptions Disp Refills    Omega-3 Fatty Acids (FISH OIL) 300 MG CAPS 90 capsule 1     Sig: Take 300 mg by mouth daily     Refused Prescriptions Disp Refills    losartan-hydroCHLOROthiazide (HYZAAR) 100-12.5 MG per tablet [Pharmacy Med Name: Losartan Potassium-HCTZ Oral Tablet 100-12.5 MG] 90 tablet 3     Sig: TAKE 1 TABLET EVERY DAY     Refused By: RODRIGUEZ BENEDICT     Reason for Refusal: Patient has requested refill too soon       Medication is on current medication list Yes    Dosage and directions were verified? Yes    Quantity verified: 90 day supply     Pharmacy Verified?  Yes    Last Appointment:  Visit date not found    Future appts:  No future appointments.     (If no appt send self scheduling link. .REFILLAPPT)  Scheduling request sent?     [] Yes  [x] No    Does patient need updated?  [] Yes  [x] No

## 2025-01-24 RX ORDER — AMLODIPINE BESYLATE 2.5 MG/1
2.5 TABLET ORAL DAILY
Qty: 90 TABLET | Refills: 3 | OUTPATIENT
Start: 2025-01-24

## 2025-01-27 ENCOUNTER — OUTSIDE SERVICES (OUTPATIENT)
Dept: PRIMARY CARE CLINIC | Age: 85
End: 2025-01-27
Payer: MEDICARE

## 2025-01-27 DIAGNOSIS — E55.9 VITAMIN D DEFICIENCY: ICD-10-CM

## 2025-01-27 DIAGNOSIS — R60.0 PERIPHERAL EDEMA: ICD-10-CM

## 2025-01-27 DIAGNOSIS — I10 PRIMARY HYPERTENSION: ICD-10-CM

## 2025-01-27 DIAGNOSIS — I48.0 PAROXYSMAL ATRIAL FIBRILLATION (HCC): ICD-10-CM

## 2025-01-27 DIAGNOSIS — R41.3 MEMORY LOSS: Primary | ICD-10-CM

## 2025-01-27 PROCEDURE — 99349 HOME/RES VST EST MOD MDM 40: CPT | Performed by: NURSE PRACTITIONER

## 2025-01-27 NOTE — PROGRESS NOTES
2025      Mel MORAES Render  1940    This resident is being seen today for a follow-up evaluation visit.  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  She is a 84 y.o. female resident who is being seen today for a follow-up evaluation with which staff does indicate that they feel that she has been doing relatively well but the resident herself had concerns about her memory.  We did discuss Prevagen but she states that she also contacted her family member who is soon to be a nurse practitioner and they recommended just prescription medication.  She said she is willing to take something as she does not want her memory to get worse.  She otherwise states that she has been doing fairly well and she denies any headaches or dizziness, sore throat, chest pain, nausea or vomiting, constipation or diarrhea, fever or chills, falls or syncopal events.        Medications:  Amlodipine 2.5 mg daily  Amoxicillin 2000 mg 1 hour prior to dental appointment  Atorvastatin 20 mg at bedtime  Calcium with vitamin D 500-200 mg daily  Carvedilol 12.5 mg twice daily  Donepezil 5 mg at bedtime  Fish oil 2000 mg daily  Losartan/hydrochlorothiazide 100-12.5 mg daily  PreserVision AREDS two 1 capsule twice daily  Systane gel 0.4-0.3% 1  Called patient no answer, left voicemail for patient to return call to Nely Canales NP office at [phone].    ECO Representative: Please reach out to [address] [facility] via Epic Secure Chat to see if a team member is available to take patient's call.    If team member is unavailable, please document in this encounter that patient returned call and route to: NELY CANALES NP IM NURSE [facility] [[id number]].     Lab added to next appt date.      Patient responded to via [facility].

## 2025-01-28 RX ORDER — DONEPEZIL HYDROCHLORIDE 5 MG/1
5 TABLET, FILM COATED ORAL NIGHTLY
Qty: 90 TABLET | Refills: 1 | Status: SHIPPED | OUTPATIENT
Start: 2025-01-28

## 2025-01-28 RX ORDER — DONEPEZIL HYDROCHLORIDE 5 MG/1
5 TABLET, FILM COATED ORAL NIGHTLY
COMMUNITY
End: 2025-01-28 | Stop reason: SDUPTHER

## 2025-01-28 NOTE — TELEPHONE ENCOUNTER
Name of Medication(s) Requested:  Requested Prescriptions     Pending Prescriptions Disp Refills    donepezil (ARICEPT) 5 MG tablet 90 tablet 1     Sig: Take 1 tablet by mouth nightly       Medication is on current medication list Yes    Dosage and directions were verified? Yes    Quantity verified: 90 day supply     Pharmacy Verified?  Yes    Last Appointment:  Visit date not found    Future appts:  No future appointments.     (If no appt send self scheduling link. .REFILLAPPT)  Scheduling request sent?     [] Yes  [x] No    Does patient need updated?  [] Yes  [x] No

## 2025-01-29 NOTE — TELEPHONE ENCOUNTER
Name of Medication(s) Requested:  Requested Prescriptions     Pending Prescriptions Disp Refills    Omega-3 Fatty Acids (FISH OIL) 300 MG CAPS 90 capsule 1     Sig: Take 300 mg by mouth daily       Medication is on current medication list Yes    Dosage and directions were verified? Yes    Quantity verified: 90 day supply     Pharmacy Verified?  Yes    Last Appointment:  Visit date not found    Future appts:  No future appointments.     (If no appt send self scheduling link. .REFILLAPPT)  Scheduling request sent?     [] Yes  [] No    Does patient need updated?  [] Yes  [x] No

## 2025-01-31 NOTE — TELEPHONE ENCOUNTER
Name of Medication(s) Requested:  Requested Prescriptions     Pending Prescriptions Disp Refills    Omega-3 Fatty Acids (FISH OIL) 300 MG CAPS 90 capsule 1     Sig: Take 300 mg by mouth daily       Medication is on current medication list Yes    Dosage and directions were verified? Yes    Quantity verified: 90 day supply     Pharmacy Verified?  Yes    Last Appointment:  Visit date not found    Future appts:  No future appointments.     (If no appt send self scheduling link. .REFILLAPPT)  Scheduling request sent?     [] Yes  [x] No    Does patient need updated?  [] Yes  [x] No

## 2025-02-17 ENCOUNTER — OUTSIDE SERVICES (OUTPATIENT)
Dept: PRIMARY CARE CLINIC | Age: 85
End: 2025-02-17

## 2025-02-17 DIAGNOSIS — R60.0 PERIPHERAL EDEMA: Primary | ICD-10-CM

## 2025-02-17 DIAGNOSIS — Z01.818 PREOPERATIVE CLEARANCE: ICD-10-CM

## 2025-02-17 DIAGNOSIS — E78.2 MIXED HYPERLIPIDEMIA: ICD-10-CM

## 2025-02-17 DIAGNOSIS — L85.3 DRY SKIN: ICD-10-CM

## 2025-02-17 DIAGNOSIS — I48.0 PAROXYSMAL ATRIAL FIBRILLATION (HCC): ICD-10-CM

## 2025-02-18 NOTE — PROGRESS NOTES
2025      Mel MORAES Render  1940    This resident is being seen today for a follow-up evaluation visit.  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  She is a 84 y.o. female resident who is being seen today for a follow-up visit with this resident recently being placed on low-dose Aricept as per her request due to her concerns regarding her memory.  Furthermore, she is scheduled to have pacer replacement this month on  which we did discuss on today's evaluation.  Staff did state that she had wanted to take some biotin on a daily basis for her hair but she never brought this up throughout my examination.  She denies any current complaints with respect to pain, headaches or dizziness, sore throat, chest pain, nausea or vomiting, constipation or diarrhea, fever or chills, falls or syncopal events.      Medications:  Amlodipine 2.5 mg daily  Amoxicillin 2000 mg 1 hour prior to dental appointment  Atorvastatin 20 mg at bedtime  Calcium with vitamin D 500-200 mg daily  Carvedilol 12.5 mg twice daily  Donepezil 5 mg at bedtime  Fish oil 2000 mg daily  Losartan/hydrochlorothiazide 100-12.5 mg daily  PreserVision AREDS two 1 capsule twice daily  Systane gel 0.4-0.3% 1 drop to both eyes twice

## 2025-03-12 ENCOUNTER — OUTSIDE SERVICES (OUTPATIENT)
Dept: PRIMARY CARE CLINIC | Age: 85
End: 2025-03-12

## 2025-03-12 DIAGNOSIS — Z45.010 PACEMAKER GENERATOR END OF LIFE: Primary | ICD-10-CM

## 2025-03-12 DIAGNOSIS — E55.9 VITAMIN D DEFICIENCY: ICD-10-CM

## 2025-03-12 DIAGNOSIS — I44.2 THIRD DEGREE HEART BLOCK (HCC): ICD-10-CM

## 2025-03-12 DIAGNOSIS — I10 PRIMARY HYPERTENSION: ICD-10-CM

## 2025-03-12 DIAGNOSIS — R60.0 PERIPHERAL EDEMA: ICD-10-CM

## 2025-03-12 NOTE — PROGRESS NOTES
3/12/2025      Mel MORAES Render  1940    This resident is being seen today for a follow-up evaluation visit.  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  She is a 84 y.o. female resident who is being seen today for a follow-up evaluation with this resident postsurgical at this time with a dual-chamber pacemaker generator change.  She states that she tolerated it well.  She does have Steri-Strips intact at this time.  She has no complaints regarding any current pain, headaches or dizziness, sore throat, chest pain, nausea or vomiting, constipation or diarrhea, fever or chills, falls or syncopal events.  It is of note to mention that she was under recent assessment for peripheral edema with which she was given the benefit of a short course of diuretic management.  I had also recommended utilization of zip up compression hose and she states that she would like that order discontinued as she indicated that she had tried some old ones that she found in her closet and she feels that it makes the edema significantly worse.        Medications:  Amlodipine 2.5 mg daily  Amoxicillin 2000 mg 1 hour prior to dental appointment  Atorvastatin 20 mg at bedtime  Calcium with vitamin D 500-200 mg daily  Carvedilol 12.5 mg twice

## 2025-03-17 RX ORDER — ATORVASTATIN CALCIUM 20 MG/1
20 TABLET, FILM COATED ORAL NIGHTLY
Qty: 90 TABLET | Refills: 3 | Status: SHIPPED | OUTPATIENT
Start: 2025-03-17

## 2025-03-17 NOTE — TELEPHONE ENCOUNTER
Name of Medication(s) Requested:  Requested Prescriptions     Pending Prescriptions Disp Refills    atorvastatin (LIPITOR) 20 MG tablet [Pharmacy Med Name: Atorvastatin Calcium Oral Tablet 20 MG] 90 tablet 3     Sig: TAKE 1 TABLET AT BEDTIME       Medication is on current medication list Yes    Dosage and directions were verified? Yes    Quantity verified: 90 day supply     Pharmacy Verified?  Yes    Last Appointment:  Visit date not found    Future appts:  No future appointments.     (If no appt send self scheduling link. .[email or web address])  Scheduling request sent?     [] Yes  [x] No    Does patient need updated?  [] Yes  [x] No

## 2025-04-07 RX ORDER — ATORVASTATIN CALCIUM 20 MG/1
20 TABLET, FILM COATED ORAL DAILY
Qty: 90 TABLET | Refills: 3 | OUTPATIENT
Start: 2025-04-07

## 2025-05-07 RX ORDER — POTASSIUM CHLORIDE 750 MG/1
10 CAPSULE, EXTENDED RELEASE ORAL
COMMUNITY
Start: 2025-04-17 | End: 2025-05-07 | Stop reason: SDUPTHER

## 2025-05-07 RX ORDER — FUROSEMIDE 20 MG/1
20 TABLET ORAL
COMMUNITY
Start: 2025-04-17 | End: 2025-05-07 | Stop reason: SDUPTHER

## 2025-05-08 RX ORDER — FUROSEMIDE 20 MG/1
20 TABLET ORAL
Qty: 39 TABLET | Refills: 1 | Status: SHIPPED | OUTPATIENT
Start: 2025-05-09

## 2025-05-08 RX ORDER — POTASSIUM CHLORIDE 750 MG/1
10 CAPSULE, EXTENDED RELEASE ORAL
Qty: 39 CAPSULE | Refills: 1 | Status: SHIPPED | OUTPATIENT
Start: 2025-05-09

## 2025-05-19 ENCOUNTER — OUTSIDE SERVICES (OUTPATIENT)
Dept: PRIMARY CARE CLINIC | Age: 85
End: 2025-05-19
Payer: MEDICARE

## 2025-05-19 DIAGNOSIS — J00 ACUTE RHINITIS: Primary | ICD-10-CM

## 2025-05-19 DIAGNOSIS — B00.1 COLD SORE: ICD-10-CM

## 2025-05-19 DIAGNOSIS — I48.0 PAROXYSMAL ATRIAL FIBRILLATION (HCC): ICD-10-CM

## 2025-05-19 DIAGNOSIS — E78.2 MIXED HYPERLIPIDEMIA: ICD-10-CM

## 2025-05-19 DIAGNOSIS — I10 PRIMARY HYPERTENSION: ICD-10-CM

## 2025-05-19 PROCEDURE — 99349 HOME/RES VST EST MOD MDM 40: CPT | Performed by: NURSE PRACTITIONER

## 2025-05-19 NOTE — PROGRESS NOTES
2025      Mel MORAES Render  1940    This resident is being seen today for a follow-up evaluation visit.  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  She is a 84 y.o. female resident who is being seen today for a follow-up visit with the resident indicating that she feels that she has been doing well outside of her concern of postnasal drip which has led to some hoarseness in the morning hours and she has been utilizing Coricidin to help with her symptoms.  Furthermore, she has 2 areas on the top of her right eyelid which has been present for quite a period of time and she has been seen and evaluated in this regard.  They do not appear to be infectious or have any stye like appearance at this time.  She states that she has spoke with her granddaughter and they felt that it could be consistent with \"blepharitis.\"  She apparently has the same issue.  She furthermore has a cold sore to the lower lip which she has been treated accordingly.  She currently denies complaints such as any eye pain or changes in her vision.  She has no chest pain, nausea or vomiting, constipation or diarrhea, fever or chills, falls or syncopal events.        Medications:  Amlodipine 2.5 mg daily  Amoxicillin 2000 mg 1 hour prior to

## 2025-06-04 ENCOUNTER — OUTSIDE SERVICES (OUTPATIENT)
Dept: PRIMARY CARE CLINIC | Age: 85
End: 2025-06-04
Payer: MEDICARE

## 2025-06-04 DIAGNOSIS — I10 PRIMARY HYPERTENSION: ICD-10-CM

## 2025-06-04 DIAGNOSIS — I48.0 PAROXYSMAL ATRIAL FIBRILLATION (HCC): ICD-10-CM

## 2025-06-04 DIAGNOSIS — H57.89 BLOOD VESSEL PROBLEMS IN EYES: Primary | ICD-10-CM

## 2025-06-04 DIAGNOSIS — E55.9 VITAMIN D DEFICIENCY: ICD-10-CM

## 2025-06-04 DIAGNOSIS — J00 ACUTE RHINITIS: ICD-10-CM

## 2025-06-04 PROCEDURE — 99349 HOME/RES VST EST MOD MDM 40: CPT | Performed by: NURSE PRACTITIONER

## 2025-06-04 NOTE — PROGRESS NOTES
2025      Mel MORAES Render  1940    This resident is being seen today for a follow-up evaluation visit.  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  She is a 84 y.o. female resident who is being seen today for a follow-up evaluation with which this resident has been using Coricidin for some ongoing rhinitis like symptoms which we had discussed on previous evaluation she had a initially stated that they were effective.  However, staff did indicate that they feel that an antihistamine on a daily basis would be better than utilizing the Coricidin so she did buy Claritin which she states she has taken times past.  Furthermore, there has been concerns regarding her left eye with which she has had broken blood vessels at least twice over the course of the past couple of weeks.  She states that she had previously seen Dr. Barrios with the department of optometry and I did encourage her to call to see if he would be able to evaluate her eye.  She states that there is really no pain associated with it and she furthermore has no changes in her vision and denies headaches or dizziness, sore throat, chest pain, coughing or shortness of breath, nausea or vomiting, constipation or diarrhea, fever or chills, falls or

## 2025-07-03 RX ORDER — DONEPEZIL HYDROCHLORIDE 5 MG/1
5 TABLET, FILM COATED ORAL NIGHTLY
Qty: 90 TABLET | Refills: 0 | Status: SHIPPED | OUTPATIENT
Start: 2025-07-03

## (undated) DEVICE — DRAPE,TOP,102X53,STERILE: Brand: MEDLINE

## (undated) DEVICE — T4 HOOD

## (undated) DEVICE — 2000CC GUARDIAN II: Brand: GUARDIAN

## (undated) DEVICE — SUTURE PDS II SZ 1 L96IN ABSRB VLT TP-1 L65MM 1/2 CIR Z880G

## (undated) DEVICE — UTILITY MARKER,BLACK WITH LABELS: Brand: DEVON

## (undated) DEVICE — SOLUTION IV 1000ML 0.9% SOD CHL

## (undated) DEVICE — SOLUTION IRRIG 3000ML 0.9% SOD CHL FLX CONT 0797208] ICU MEDICAL INC]

## (undated) DEVICE — FAN SPRAY KIT: Brand: PULSAVAC®

## (undated) DEVICE — GOWN,REINF,POLY,ECL,PP SLV,XL: Brand: MEDLINE

## (undated) DEVICE — STOCKINETTE TUBE 9X48 -- MEDICHOICE

## (undated) DEVICE — SYR 50ML LR LCK 1ML GRAD NSAF --

## (undated) DEVICE — AMD ANTIMICROBIAL NON-ADHERENT PAD,0.2% POLYHEXAMETHYLENE BIGUANIDE HCI (PHMB): Brand: TELFA

## (undated) DEVICE — TRAY PREP DRY W/ PREM GLV 2 APPL 6 SPNG 2 UNDPD 1 OVERWRAP

## (undated) DEVICE — Z DISCONTINUED USE 2744636  DRESSING AQUACEL 14 IN ALG W3.5XL14IN POLYUR FLM CVR W/ HYDRCOLL

## (undated) DEVICE — SOLUTION IV 500ML 0.9% SOD CHL FLX CONT

## (undated) DEVICE — REM POLYHESIVE ADULT PATIENT RETURN ELECTRODE: Brand: VALLEYLAB

## (undated) DEVICE — TRAY CATH 16F DRN BG LTX -- CONVERT TO ITEM 363158

## (undated) DEVICE — 3000CC GUARDIAN II: Brand: GUARDIAN

## (undated) DEVICE — OSCILLATING TIP SAW CARTRIDGE: Brand: STRYKER PRECISION

## (undated) DEVICE — MEDI-VAC YANKAUER SUCTION HANDLE W/BULBOUS TIP: Brand: CARDINAL HEALTH

## (undated) DEVICE — SKIN STAPLER: Brand: SIGNET

## (undated) DEVICE — SUTURE VCRL SZ 1 L27IN ABSRB UD L36MM CP-1 1/2 CIR REV CUT J268H

## (undated) DEVICE — BIPOLAR SEALER 23-112-1 AQM 6.0: Brand: AQUAMANTYS ®

## (undated) DEVICE — (D)PREP SKN CHLRAPRP APPL 26ML -- CONVERT TO ITEM 371833

## (undated) DEVICE — SYR LR LCK 1ML GRAD NSAF 30ML --

## (undated) DEVICE — SUTURE MCRYL SZ 2-0 L27IN ABSRB UD CP-1 1 L36MM 1/2 CIR REV Y266H

## (undated) DEVICE — BLADE SAW PAT RMR PILT H 38MM --

## (undated) DEVICE — CURETTE BNE CEM 10IN DISP --

## (undated) DEVICE — NEEDLE HYPO 21GA L1.5IN INTRAMUSCULAR S STL LATCH BVL UP

## (undated) DEVICE — BUTTON SWITCH PENCIL BLADE ELECTRODE, HOLSTER: Brand: EDGE

## (undated) DEVICE — PACK PROCEDURE SURG TOT KNEE

## (undated) DEVICE — 3M™ COBAN™ NL STERILE NON-LATEX SELF-ADHERENT WRAP, 2084S, 4 IN X 5 YD (10 CM X 4,5 M), 18 ROLLS/CASE: Brand: 3M™ COBAN™